# Patient Record
Sex: FEMALE | Race: BLACK OR AFRICAN AMERICAN | NOT HISPANIC OR LATINO | Employment: UNEMPLOYED | URBAN - METROPOLITAN AREA
[De-identification: names, ages, dates, MRNs, and addresses within clinical notes are randomized per-mention and may not be internally consistent; named-entity substitution may affect disease eponyms.]

---

## 2019-01-26 ENCOUNTER — HOSPITAL ENCOUNTER (OUTPATIENT)
Dept: LABOR AND DELIVERY | Facility: HOSPITAL | Age: 29
Discharge: HOME | End: 2019-01-26
Attending: OBSTETRICS & GYNECOLOGY

## 2019-04-24 ENCOUNTER — HOSPITAL ENCOUNTER (OUTPATIENT)
Dept: LABOR AND DELIVERY | Facility: HOSPITAL | Age: 29
Discharge: HOME | End: 2019-04-24
Attending: OBSTETRICS & GYNECOLOGY

## 2019-05-13 ENCOUNTER — HOSPITAL ENCOUNTER (OUTPATIENT)
Dept: LABOR AND DELIVERY | Facility: HOSPITAL | Age: 29
Discharge: HOME | End: 2019-05-13
Attending: OBSTETRICS & GYNECOLOGY

## 2021-02-08 ENCOUNTER — HOSPITAL ENCOUNTER (OUTPATIENT)
Dept: LABOR AND DELIVERY | Facility: HOSPITAL | Age: 31
Discharge: HOME | End: 2021-02-08
Attending: OBSTETRICS & GYNECOLOGY

## 2021-02-26 ENCOUNTER — HOSPITAL ENCOUNTER (OUTPATIENT)
Dept: PREADMISSION TESTING | Facility: HOSPITAL | Age: 31
Discharge: HOME | End: 2021-02-26
Attending: OBSTETRICS & GYNECOLOGY

## 2021-02-26 LAB — SARS-COV-2 RNA SPEC QL NAA+PROBE: NOT DETECTED

## 2021-10-21 ENCOUNTER — INITIAL PRENATAL (OUTPATIENT)
Dept: OBSTETRICS AND GYNECOLOGY | Facility: CLINIC | Age: 31
End: 2021-10-21

## 2021-10-21 VITALS
SYSTOLIC BLOOD PRESSURE: 156 MMHG | HEIGHT: 62 IN | WEIGHT: 129 LBS | DIASTOLIC BLOOD PRESSURE: 103 MMHG | BODY MASS INDEX: 23.74 KG/M2

## 2021-10-21 DIAGNOSIS — Z34.80 SUPERVISION OF OTHER NORMAL PREGNANCY, ANTEPARTUM: Primary | ICD-10-CM

## 2021-10-21 DIAGNOSIS — Z98.891 HISTORY OF 3 CESAREAN SECTIONS: ICD-10-CM

## 2021-10-21 DIAGNOSIS — O21.9 NAUSEA AND VOMITING DURING PREGNANCY: ICD-10-CM

## 2021-10-21 LAB
ABO GROUP BLD: NORMAL
B-HCG UR QL: POSITIVE
BASOPHILS # BLD AUTO: 0.02 10*3/MM3 (ref 0–0.2)
BASOPHILS NFR BLD AUTO: 0.3 % (ref 0–1.5)
BLD GP AB SCN SERPL QL: NEGATIVE
DEPRECATED RDW RBC AUTO: 44.3 FL (ref 37–54)
EOSINOPHIL # BLD AUTO: 0.04 10*3/MM3 (ref 0–0.4)
EOSINOPHIL NFR BLD AUTO: 0.6 % (ref 0.3–6.2)
ERYTHROCYTE [DISTWIDTH] IN BLOOD BY AUTOMATED COUNT: 13.3 % (ref 12.3–15.4)
EXPIRATION DATE: ABNORMAL
GLUCOSE UR STRIP-MCNC: NEGATIVE MG/DL
HCT VFR BLD AUTO: 39.6 % (ref 34–46.6)
HGB BLD-MCNC: 13.3 G/DL (ref 12–15.9)
HIV1+2 AB SER QL: NORMAL
IMM GRANULOCYTES # BLD AUTO: 0.01 10*3/MM3 (ref 0–0.05)
IMM GRANULOCYTES NFR BLD AUTO: 0.2 % (ref 0–0.5)
INTERNAL NEGATIVE CONTROL: ABNORMAL
INTERNAL POSITIVE CONTROL: ABNORMAL
LYMPHOCYTES # BLD AUTO: 1.78 10*3/MM3 (ref 0.7–3.1)
LYMPHOCYTES NFR BLD AUTO: 27.9 % (ref 19.6–45.3)
Lab: ABNORMAL
MCH RBC QN AUTO: 30.6 PG (ref 26.6–33)
MCHC RBC AUTO-ENTMCNC: 33.6 G/DL (ref 31.5–35.7)
MCV RBC AUTO: 91 FL (ref 79–97)
MONOCYTES # BLD AUTO: 0.46 10*3/MM3 (ref 0.1–0.9)
MONOCYTES NFR BLD AUTO: 7.2 % (ref 5–12)
NEUTROPHILS NFR BLD AUTO: 4.08 10*3/MM3 (ref 1.7–7)
NEUTROPHILS NFR BLD AUTO: 63.8 % (ref 42.7–76)
NRBC BLD AUTO-RTO: 0 /100 WBC (ref 0–0.2)
PLATELET # BLD AUTO: 208 10*3/MM3 (ref 140–450)
PMV BLD AUTO: 11.9 FL (ref 6–12)
PROT UR STRIP-MCNC: NEGATIVE MG/DL
RBC # BLD AUTO: 4.35 10*6/MM3 (ref 3.77–5.28)
RH BLD: POSITIVE
WBC # BLD AUTO: 6.39 10*3/MM3 (ref 3.4–10.8)

## 2021-10-21 PROCEDURE — G0432 EIA HIV-1/HIV-2 SCREEN: HCPCS | Performed by: OBSTETRICS & GYNECOLOGY

## 2021-10-21 PROCEDURE — 81025 URINE PREGNANCY TEST: CPT | Performed by: OBSTETRICS & GYNECOLOGY

## 2021-10-21 PROCEDURE — 87491 CHLMYD TRACH DNA AMP PROBE: CPT | Performed by: OBSTETRICS & GYNECOLOGY

## 2021-10-21 PROCEDURE — 86803 HEPATITIS C AB TEST: CPT | Performed by: OBSTETRICS & GYNECOLOGY

## 2021-10-21 PROCEDURE — G0123 SCREEN CERV/VAG THIN LAYER: HCPCS | Performed by: OBSTETRICS & GYNECOLOGY

## 2021-10-21 PROCEDURE — 87661 TRICHOMONAS VAGINALIS AMPLIF: CPT | Performed by: OBSTETRICS & GYNECOLOGY

## 2021-10-21 PROCEDURE — 87086 URINE CULTURE/COLONY COUNT: CPT | Performed by: OBSTETRICS & GYNECOLOGY

## 2021-10-21 PROCEDURE — 99214 OFFICE O/P EST MOD 30 MIN: CPT | Performed by: OBSTETRICS & GYNECOLOGY

## 2021-10-21 PROCEDURE — 87591 N.GONORRHOEAE DNA AMP PROB: CPT | Performed by: OBSTETRICS & GYNECOLOGY

## 2021-10-21 PROCEDURE — 83020 HEMOGLOBIN ELECTROPHORESIS: CPT | Performed by: OBSTETRICS & GYNECOLOGY

## 2021-10-21 RX ORDER — ONDANSETRON 4 MG/1
4 TABLET, ORALLY DISINTEGRATING ORAL EVERY 8 HOURS PRN
Qty: 40 TABLET | Refills: 3 | Status: SHIPPED | OUTPATIENT
Start: 2021-10-21 | End: 2022-05-10 | Stop reason: SDUPTHER

## 2021-10-21 NOTE — PROGRESS NOTES
"Chief Complaint:  Scheduled OB visit    HPI: 31 y.o.  at 9w1d   Nausea, desires Zofran ODT    Vitals:    10/21/21 1342 10/21/21 1343   BP: (!) 156/103    Weight: 58.5 kg (129 lb)    Height:  156.2 cm (61.5\")       See OB flowsheet also for pregnancy related data.  Physical Exam  Vitals and nursing note reviewed. Exam conducted with a chaperone present.   Constitutional:       Appearance: Normal appearance.   Neck:      Thyroid: No thyroid mass or thyromegaly.   Cardiovascular:      Rate and Rhythm: Regular rhythm.   Pulmonary:      Effort: Pulmonary effort is normal.      Unlabored  Abdominal:      General: There is no distension.      Palpations: Abdomen is soft.      Tenderness: There is no guarding or rebound.   Genitourinary:     General: Normal vulva.      Labia:   No lesions     Urethra: No urethral pain or urethral lesion.      Vagina: Normal. No vaginal discharge, tenderness or prolapsed vaginal walls.      Cervix: Normal.      Uterus: Normal.       Adnexa: Right adnexa normal and left adnexa normal.   Musculoskeletal:      Cervical back: Neck supple. No tenderness.   Skin:     General: Skin is warm and dry.   Neurological:      Mental Status: She is alert and oriented to person, place, and time.   Psychiatric:         Mood and Affect: Mood normal.         Behavior: Behavior normal.         Thought Content: Thought content normal.       A/P  1. Intrauterine pregnancy at 9w1d   2. Pregnancy Risk:  COMPLICATED   Complicated by prior  deliveries    Blood pressure is out of parameters.  No history of gestational hypertension      Diagnoses and all orders for this visit:    1. Supervision of other normal pregnancy, antepartum (Primary)  -     POC Urinalysis Dipstick  -     POC Pregnancy, Urine  -     OB Panel With HIV  -     Hemoglobinopathy Fractionation Cascade  -     Urine Culture - Urine, Urine, Clean Catch  -     IGP,CtNgTv,rfx Aptima HPV ASCU    2. History of 3  sections    3. Nausea " and vomiting during pregnancy  -     ondansetron ODT (Zofran ODT) 4 MG disintegrating tablet; Place 1 tablet on the tongue Every 8 (Eight) Hours As Needed for Nausea or Vomiting.  Dispense: 40 tablet; Refill: 3    Follow-up in 2 weeks to recheck blood pressure.  Will start baby aspirin and/or medication if blood pressure is elevated    Continue prenatal vitamins.  Discussed round ligament pain  Discussed constipation and use of stool softeners  Discussed need for office visit, fetal heart tones if early trimester bleeding  -----------------------  PLAN:   Return in about 4 weeks (around 11/18/2021).    Madi Del Toro Sr., MD  10/21/2021 14:09 EDT

## 2021-10-22 LAB
HBV SURFACE AG SERPL QL IA: NORMAL
HCV AB SER DONR QL: NORMAL
RPR SER QL: NORMAL

## 2021-10-23 LAB
BACTERIA SPEC AEROBE CULT: NORMAL
RUBV IGG SERPL IA-ACNC: 5.43 INDEX

## 2021-10-25 LAB
HGB A MFR BLD ELPH: 97.3 % (ref 96.4–98.8)
HGB A2 MFR BLD ELPH: 2.7 % (ref 1.8–3.2)
HGB F MFR BLD ELPH: 0 % (ref 0–2)
HGB FRACT BLD-IMP: NORMAL
HGB S MFR BLD ELPH: 0 %

## 2021-10-27 LAB
C TRACH RRNA CVX QL NAA+PROBE: NEGATIVE
CONV .: NORMAL
CYTOLOGIST CVX/VAG CYTO: NORMAL
CYTOLOGY CVX/VAG DOC CYTO: NORMAL
CYTOLOGY CVX/VAG DOC THIN PREP: NORMAL
DX ICD CODE: NORMAL
HIV 1 & 2 AB SER-IMP: NORMAL
Lab: NORMAL
N GONORRHOEA RRNA CVX QL NAA+PROBE: NEGATIVE
OTHER STN SPEC: NORMAL
STAT OF ADQ CVX/VAG CYTO-IMP: NORMAL
T VAGINALIS RRNA SPEC QL NAA+PROBE: NEGATIVE

## 2021-11-16 ENCOUNTER — ROUTINE PRENATAL (OUTPATIENT)
Dept: OBSTETRICS AND GYNECOLOGY | Facility: CLINIC | Age: 31
End: 2021-11-16

## 2021-11-16 VITALS — SYSTOLIC BLOOD PRESSURE: 147 MMHG | WEIGHT: 137 LBS | BODY MASS INDEX: 25.47 KG/M2 | DIASTOLIC BLOOD PRESSURE: 106 MMHG

## 2021-11-16 DIAGNOSIS — Z34.80 SUPERVISION OF OTHER NORMAL PREGNANCY, ANTEPARTUM: ICD-10-CM

## 2021-11-16 DIAGNOSIS — Z3A.12 12 WEEKS GESTATION OF PREGNANCY: Primary | ICD-10-CM

## 2021-11-16 DIAGNOSIS — I10 ESSENTIAL HYPERTENSION: ICD-10-CM

## 2021-11-16 LAB
ALBUMIN SERPL-MCNC: 4.2 G/DL (ref 3.5–5.2)
ALBUMIN/GLOB SERPL: 1.3 G/DL
ALP SERPL-CCNC: 66 U/L (ref 39–117)
ALT SERPL W P-5'-P-CCNC: 7 U/L (ref 1–33)
ANION GAP SERPL CALCULATED.3IONS-SCNC: 11.7 MMOL/L (ref 5–15)
AST SERPL-CCNC: 14 U/L (ref 1–32)
BILIRUB SERPL-MCNC: <0.2 MG/DL (ref 0–1.2)
BUN SERPL-MCNC: 12 MG/DL (ref 6–20)
BUN/CREAT SERPL: 41.4 (ref 7–25)
CALCIUM SPEC-SCNC: 9.6 MG/DL (ref 8.6–10.5)
CHLORIDE SERPL-SCNC: 101 MMOL/L (ref 98–107)
CO2 SERPL-SCNC: 21.3 MMOL/L (ref 22–29)
CREAT SERPL-MCNC: 0.29 MG/DL (ref 0.57–1)
CREAT UR-MCNC: 224 MG/DL
GFR SERPL CREATININE-BSD FRML MDRD: >150 ML/MIN/1.73
GLOBULIN UR ELPH-MCNC: 3.3 GM/DL
GLUCOSE SERPL-MCNC: 98 MG/DL (ref 65–99)
GLUCOSE UR STRIP-MCNC: NEGATIVE MG/DL
LEUKOCYTE EST, POC: NEGATIVE
NITRITE UR-MCNC: NEGATIVE MG/ML
POTASSIUM SERPL-SCNC: 4.2 MMOL/L (ref 3.5–5.2)
PROT SERPL-MCNC: 7.5 G/DL (ref 6–8.5)
PROT UR STRIP-MCNC: NEGATIVE MG/DL
PROT UR-MCNC: 27.2 MG/DL
PROT/CREAT UR: 0.1 MG/G{CREAT}
SODIUM SERPL-SCNC: 134 MMOL/L (ref 136–145)

## 2021-11-16 PROCEDURE — 84156 ASSAY OF PROTEIN URINE: CPT | Performed by: NURSE PRACTITIONER

## 2021-11-16 PROCEDURE — 99214 OFFICE O/P EST MOD 30 MIN: CPT | Performed by: NURSE PRACTITIONER

## 2021-11-16 PROCEDURE — 82570 ASSAY OF URINE CREATININE: CPT | Performed by: NURSE PRACTITIONER

## 2021-11-16 PROCEDURE — 80053 COMPREHEN METABOLIC PANEL: CPT | Performed by: NURSE PRACTITIONER

## 2021-11-16 RX ORDER — LABETALOL 100 MG/1
100 TABLET, FILM COATED ORAL 2 TIMES DAILY
Qty: 60 TABLET | Refills: 5 | Status: SHIPPED | OUTPATIENT
Start: 2021-11-16 | End: 2022-04-18 | Stop reason: SDUPTHER

## 2021-11-16 RX ORDER — ASPIRIN 81 MG/1
81 TABLET ORAL DAILY
Qty: 90 TABLET | Refills: 2 | Status: SHIPPED | OUTPATIENT
Start: 2021-11-16 | End: 2022-05-10

## 2021-11-29 ENCOUNTER — TELEPHONE (OUTPATIENT)
Dept: OBSTETRICS AND GYNECOLOGY | Facility: CLINIC | Age: 31
End: 2021-11-29

## 2021-12-21 ENCOUNTER — ROUTINE PRENATAL (OUTPATIENT)
Dept: OBSTETRICS AND GYNECOLOGY | Facility: CLINIC | Age: 31
End: 2021-12-21

## 2021-12-21 VITALS — DIASTOLIC BLOOD PRESSURE: 79 MMHG | SYSTOLIC BLOOD PRESSURE: 116 MMHG | BODY MASS INDEX: 27.55 KG/M2 | WEIGHT: 148.2 LBS

## 2021-12-21 DIAGNOSIS — I10 ESSENTIAL HYPERTENSION: ICD-10-CM

## 2021-12-21 DIAGNOSIS — Z3A.17 17 WEEKS GESTATION OF PREGNANCY: Primary | ICD-10-CM

## 2021-12-21 DIAGNOSIS — Z34.80 SUPERVISION OF OTHER NORMAL PREGNANCY, ANTEPARTUM: ICD-10-CM

## 2021-12-21 LAB
GLUCOSE UR STRIP-MCNC: NEGATIVE MG/DL
LEUKOCYTE EST, POC: NEGATIVE
NITRITE UR-MCNC: NEGATIVE MG/ML
PROT UR STRIP-MCNC: NEGATIVE MG/DL

## 2021-12-21 PROCEDURE — 99213 OFFICE O/P EST LOW 20 MIN: CPT | Performed by: NURSE PRACTITIONER

## 2021-12-21 PROCEDURE — 82105 ALPHA-FETOPROTEIN SERUM: CPT | Performed by: NURSE PRACTITIONER

## 2021-12-21 NOTE — PROGRESS NOTES
OB FOLLOW UP        Chief Complaint   Patient presents with   • Follow-up     17 weeks OB follow up        Subjective:   • No complaints  • Denies HA, vision changes/scotomata, RUQ/epigastric pain or UE/facial edema  • States is taking her blood pressure medication and doing well    Objective:  /79   Wt 67.2 kg (148 lb 3.2 oz)   LMP 08/18/2021   BMI 27.55 kg/m²   Uterine Size: size equals dates, below umbilicus  FHT: 110-160 BPM    See OB flow for LE edema, cvx exam if performed, and Upro/Uglu    Assessment and Plan:  17w6d  Reassuring pregnancy progress.  Questions answered.  Diagnoses and all orders for this visit:    1. 17 weeks gestation of pregnancy (Primary)  -     POC Urinalysis Dipstick    2. Supervision of other normal pregnancy, antepartum  Overview:  GUILLERMO finalized: 5/25/22    Genetic testing:    • NIPS    Vaccinations:  • COVID:   • Influenza:   • Tdap:    24-28 weeks:  • Tdap Rx:  • Rhogam:   • ?BTL/Bilat salpingectomy:    Third Trimester:  • GBS:  • Breast pump:  • ?HSV:    Ultrasound:  • Anatomy:  • FU US:    PROBLEM LIST/PLAN:   HTN - ASA 81mg and labetalol initiated 11/16/21         Orders:  -     Alpha Fetoprotein, Maternal  -     US Ob 14 + Weeks Single or First Gestation; Future    3. Essential hypertension    Counseling:    • Second trimester precautions  • Continue PNV.  Importance of healthy eating and exercise.    Return in about 2 weeks (around 1/4/2022) for [facility], Anatomy ultrasound.            Ajith Barrera, APRN  12/21/2021    [facility] [place]  [facility]  [address] [place] [address]  Dept: [phone]  Loc: [phone]

## 2021-12-28 LAB
AFP ADJ MOM SERPL: 0.91
AFP INTERP SERPL-IMP: NORMAL
AFP INTERP SERPL-IMP: NORMAL
AFP SERPL-MCNC: 43.7 NG/ML
AGE AT DELIVERY: 33.1 YR
GA METHOD: NORMAL
GA: 17.9 WEEKS
IDDM PATIENT QL: NORMAL
LABORATORY COMMENT REPORT: NORMAL
MULTIPLE PREGNANCY: NORMAL
NEURAL TUBE DEFECT RISK FETUS: NORMAL %
RESULT: NORMAL

## 2021-12-29 ENCOUNTER — TELEPHONE (OUTPATIENT)
Dept: OBSTETRICS AND GYNECOLOGY | Facility: CLINIC | Age: 31
End: 2021-12-29

## 2021-12-29 NOTE — TELEPHONE ENCOUNTER
----- Message from FREDERIC Livingston sent at 12/28/2021  3:04 PM EST -----  Please let patient know her screen for neural tube defects is negative.

## 2022-01-05 ENCOUNTER — ROUTINE PRENATAL (OUTPATIENT)
Dept: OBSTETRICS AND GYNECOLOGY | Facility: CLINIC | Age: 32
End: 2022-01-05

## 2022-01-05 VITALS — BODY MASS INDEX: 27.88 KG/M2 | WEIGHT: 150 LBS | SYSTOLIC BLOOD PRESSURE: 118 MMHG | DIASTOLIC BLOOD PRESSURE: 79 MMHG

## 2022-01-05 DIAGNOSIS — O10.919 CHRONIC HYPERTENSION AFFECTING PREGNANCY: ICD-10-CM

## 2022-01-05 DIAGNOSIS — Z34.80 SUPERVISION OF OTHER NORMAL PREGNANCY, ANTEPARTUM: Primary | ICD-10-CM

## 2022-01-05 LAB
GLUCOSE UR STRIP-MCNC: NEGATIVE MG/DL
PROT UR STRIP-MCNC: NEGATIVE MG/DL

## 2022-01-05 PROCEDURE — 99214 OFFICE O/P EST MOD 30 MIN: CPT | Performed by: OBSTETRICS & GYNECOLOGY

## 2022-01-05 NOTE — PROGRESS NOTES
Chief Complaint:  Scheduled OB visit    HPI: 31 y.o.  at 20w0d   No complaints, positive baby movement  Ultrasound done today    Vitals:    22 1402   BP: 118/79   Weight: 68 kg (150 lb)       See OB flowsheet also for pregnancy related data.    A/P  Intrauterine pregnancy at 20w0d   Diagnoses and all orders for this visit:    1. Supervision of other normal pregnancy, antepartum (Primary)  Overview:  GUILLERMO finalized: 22    Genetic testing:    • NIPS negative, gender male    Vaccinations:  • COVID: Unvaccinated  • Influenza: Vaccinated  • Tdap:    24-28 weeks:  • Tdap Rx:  • Rhogam: Rh+  • ?BTL/Bilat salpingectomy: Desires sterilization    Third Trimester:  • GBS:  • Breast pump:  • ?HSV: No exposure    Ultrasound:  • Anatomy: No placenta previa.  • FU US:    PROBLEM LIST/PLAN:   HTN - ASA 81mg and labetalol initiated 21         Orders:  -     POC Urinalysis Dipstick    2. Chronic hypertension affecting pregnancy  -     US Ob 14 + Weeks Single or First Gestation; Future  Continue labetalol 100 mg twice daily.  Blood pressure well controlled    Continue prenatal vitamins.  Pre-eclampsia symptoms were discussed and warnings were given.    PLAN:   Return in about 4 weeks (around 2022).    Madi Del Toro Sr., MD  2022 14:40 EST

## 2022-01-12 ENCOUNTER — TELEPHONE (OUTPATIENT)
Dept: LABOR AND DELIVERY | Facility: HOSPITAL | Age: 32
End: 2022-01-12

## 2022-01-12 NOTE — TELEPHONE ENCOUNTER
Called patient to introduce [employer] program. No answer. Left message and return number.

## 2022-02-02 ENCOUNTER — TELEPHONE (OUTPATIENT)
Dept: OBSTETRICS AND GYNECOLOGY | Facility: CLINIC | Age: 32
End: 2022-02-02

## 2022-02-02 NOTE — TELEPHONE ENCOUNTER
CALLED PT AND LEFT A VM FOR HER TO CALL BACK AND RESCHEDULE HER APPOINTMENTS DUE TO THE WEATHER. I ALSO LEFT HER A MSG ON MY CHART.  IF SHE CALLS IN PLEASE RESCHEDULE HER APPT FOR US AND OB APPT.         By Priyanka Guardado RegSched Rep

## 2022-02-10 ENCOUNTER — ROUTINE PRENATAL (OUTPATIENT)
Dept: OBSTETRICS AND GYNECOLOGY | Facility: CLINIC | Age: 32
End: 2022-02-10

## 2022-02-10 VITALS — WEIGHT: 157 LBS | BODY MASS INDEX: 29.18 KG/M2 | DIASTOLIC BLOOD PRESSURE: 78 MMHG | SYSTOLIC BLOOD PRESSURE: 117 MMHG

## 2022-02-10 DIAGNOSIS — O10.919 CHRONIC HYPERTENSION AFFECTING PREGNANCY: ICD-10-CM

## 2022-02-10 DIAGNOSIS — Z34.80 SUPERVISION OF OTHER NORMAL PREGNANCY, ANTEPARTUM: Primary | ICD-10-CM

## 2022-02-10 LAB
DEPRECATED RDW RBC AUTO: 38.1 FL (ref 37–54)
ERYTHROCYTE [DISTWIDTH] IN BLOOD BY AUTOMATED COUNT: 11.6 % (ref 12.3–15.4)
GLUCOSE 1H P GLC SERPL-MCNC: 153 MG/DL (ref 65–139)
GLUCOSE UR STRIP-MCNC: NEGATIVE MG/DL
HCT VFR BLD AUTO: 32 % (ref 34–46.6)
HGB BLD-MCNC: 10.8 G/DL (ref 12–15.9)
MCH RBC QN AUTO: 30.9 PG (ref 26.6–33)
MCHC RBC AUTO-ENTMCNC: 33.8 G/DL (ref 31.5–35.7)
MCV RBC AUTO: 91.7 FL (ref 79–97)
PLATELET # BLD AUTO: 157 10*3/MM3 (ref 140–450)
PMV BLD AUTO: 13.2 FL (ref 6–12)
PROT UR STRIP-MCNC: NEGATIVE MG/DL
RBC # BLD AUTO: 3.49 10*6/MM3 (ref 3.77–5.28)
WBC NRBC COR # BLD: 7.04 10*3/MM3 (ref 3.4–10.8)

## 2022-02-10 PROCEDURE — 99214 OFFICE O/P EST MOD 30 MIN: CPT | Performed by: OBSTETRICS & GYNECOLOGY

## 2022-02-10 PROCEDURE — 85027 COMPLETE CBC AUTOMATED: CPT | Performed by: OBSTETRICS & GYNECOLOGY

## 2022-02-10 PROCEDURE — 82950 GLUCOSE TEST: CPT | Performed by: OBSTETRICS & GYNECOLOGY

## 2022-02-10 RX ORDER — PRENATAL VIT NO.126/IRON/FOLIC 28MG-0.8MG
1 TABLET ORAL DAILY
COMMUNITY

## 2022-02-10 NOTE — PROGRESS NOTES
Chief Complaint:  Scheduled OB visit    HPI: 31 y.o.  at 25w1d   Positive baby movement  Glucola performed today  Confirmed labetalol 100 mg twice daily    Vitals:    02/10/22 1052   BP: 117/78   Weight: 71.2 kg (157 lb)       See OB flowsheet also for pregnancy related data.    A/P  Intrauterine pregnancy at 25w1d   Diagnoses and all orders for this visit:    1. Supervision of other normal pregnancy, antepartum (Primary)  Overview:  GUILLERMO finalized: 22    Genetic testing:    • NIPS negative, gender male    Vaccinations:  • COVID: Unvaccinated  • Influenza: Vaccinated  • Tdap:    24-28 weeks:  • Tdap Rx:  • Rhogam: Rh+  • ?BTL/Bilat salpingectomy: Desires sterilization    Third Trimester:  • GBS:  • Breast pump:  • ?HSV: No exposure    Ultrasound:  • Anatomy: No placenta previa.  • FU US:    PROBLEM LIST/PLAN:   HTN - ASA 81mg and labetalol initiated 21         Orders:  -     POC Urinalysis Dipstick  -     CBC (No Diff)  -     Gestational Diabetes Screen    2. Chronic hypertension affecting pregnancy  Ultrasound is scheduled in approximately 2 weeks for growth with chronic hypertension  Continue labetalol 100 mg twice daily, blood pressure great today    Continue prenatal vitamins.      PLAN:   Return in about 2 weeks (around 2022).    Madi Del Toro Sr., MD  2/10/2022 11:21 EST

## 2022-02-11 ENCOUNTER — TELEPHONE (OUTPATIENT)
Dept: OBSTETRICS AND GYNECOLOGY | Facility: CLINIC | Age: 32
End: 2022-02-11

## 2022-02-11 DIAGNOSIS — O99.019 MATERNAL ANEMIA IN PREGNANCY, ANTEPARTUM: Primary | ICD-10-CM

## 2022-02-11 RX ORDER — IRON,CARBONYL/ASCORBIC ACID 100-250 MG
1 TABLET ORAL DAILY
Qty: 90 EACH | Refills: 2 | Status: SHIPPED | OUTPATIENT
Start: 2022-02-11 | End: 2022-05-10

## 2022-02-11 NOTE — TELEPHONE ENCOUNTER
----- Message from Madi Del Toro Sr., MD sent at 2/11/2022  3:07 PM EST -----  Need 3-hour GTT, need iron   ----- Message -----  From: Marilu Encarnacion MA  Sent: 2/10/2022  10:55 AM EST  To: Madi Del Toro Sr., MD

## 2022-02-17 ENCOUNTER — TELEPHONE (OUTPATIENT)
Dept: OBSTETRICS AND GYNECOLOGY | Facility: CLINIC | Age: 32
End: 2022-02-17

## 2022-02-17 NOTE — TELEPHONE ENCOUNTER
Discussed the needs for three hr gtt ASAP. Adv we have also sent iron to her pharmacy for iron deficiency. PT has an ultrasound @ [facility] on Monday, adv if she could do her three hr that morning that would be perfect but just stressed the importance of getting it done asap.

## 2022-02-17 NOTE — TELEPHONE ENCOUNTER
----- Message from Madi Del Toro Sr., MD sent at 2/11/2022  3:09 PM EST -----  Iron with vitamin C prescription sent to [facility] pharmacy  ----- Message -----  From: Marilu Encarnacion MA  Sent: 2/10/2022  10:55 AM EST  To: Madi Del Toro Sr., MD

## 2022-02-21 ENCOUNTER — TELEPHONE (OUTPATIENT)
Dept: OBSTETRICS AND GYNECOLOGY | Facility: CLINIC | Age: 32
End: 2022-02-21

## 2022-02-21 ENCOUNTER — ROUTINE PRENATAL (OUTPATIENT)
Dept: OBSTETRICS AND GYNECOLOGY | Facility: CLINIC | Age: 32
End: 2022-02-21

## 2022-02-21 ENCOUNTER — HOSPITAL ENCOUNTER (OUTPATIENT)
Dept: ULTRASOUND IMAGING | Facility: HOSPITAL | Age: 32
Discharge: HOME | End: 2022-02-21
Admitting: OBSTETRICS & GYNECOLOGY

## 2022-02-21 VITALS — SYSTOLIC BLOOD PRESSURE: 123 MMHG | DIASTOLIC BLOOD PRESSURE: 80 MMHG | BODY MASS INDEX: 29.48 KG/M2 | WEIGHT: 158.6 LBS

## 2022-02-21 DIAGNOSIS — N88.8 CERVICAL FUNNELING: ICD-10-CM

## 2022-02-21 DIAGNOSIS — Z34.80 SUPERVISION OF OTHER NORMAL PREGNANCY, ANTEPARTUM: Primary | ICD-10-CM

## 2022-02-21 DIAGNOSIS — O10.919 CHRONIC HYPERTENSION AFFECTING PREGNANCY: ICD-10-CM

## 2022-02-21 LAB
GLUCOSE UR STRIP-MCNC: NEGATIVE MG/DL
PROT UR STRIP-MCNC: NEGATIVE MG/DL

## 2022-02-21 PROCEDURE — 76805 OB US >/= 14 WKS SNGL FETUS: CPT

## 2022-02-21 PROCEDURE — 99213 OFFICE O/P EST LOW 20 MIN: CPT | Performed by: OBSTETRICS & GYNECOLOGY

## 2022-02-21 NOTE — TELEPHONE ENCOUNTER
Received a call from Dr. Martin at 1441 hr stating patient had growth scan today.  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 Discussed with Dr. Guzman ([facility]) who advised patient to come in for cervical check.  Patient was contacted at 1458 and states can be here within the next 45 minutes.

## 2022-02-21 NOTE — PROGRESS NOTES
OB FOLLOW UP      Chief Complaint   Patient presents with   • Routine Prenatal Visit     F/U US     Subjective:   • Pt seen for US at [facility].  Radiology called to state cvx appeared to have internal funneling intermittently in scan.  Scan was abd only.  Pt denies cxns, VB, leaking or abn vag DC.  All CS x3 at term.  Pt contacted by office to come today for eval.    Objective:  /80   Wt 71.9 kg (158 lb 9.6 oz)   LMP 08/18/2021   BMI 29.48 kg/m²  13.4 kg (29 lb 9.6 oz)  Uterine Size: not examined.  Abdomen soft NT.  FHT: see US  Pelvic exam: Cvx visably closed, on exam L/T/C, midposition, medium consistency.  No vag dc.  No odor.  See OB flow for edema, cvx exam if performed, and Upro/Uglu    Assessment and Plan:  26w5d  Reassuring pregnancy progress.  Questions answered.  Diagnoses and all orders for this visit:    1. Cervical funneling- possible by [facility] anatomy scan  -     US Ob Transvaginal tomorrow.  Exam today reassurring.  DW pt briefly possible outcomes depending on TVUS tomorrow.  No US tech in office today to perform cvx length.  Rec pt avoid sex or heavy lifting.  Questions answered.    Orders:  -     POC Urinalysis Dipstick    Counseling:    • OB precautions, leaking, VB, valentín ornelas vs PTL/Labor  • FKC  • Continue PNV.  Importance of healthy eating and staying active.    Return in about 1 day (around 2/22/2022) for Follow up US.            Lia Guzman,   02/21/2022    [facility]  [facility] [place] [address]  Dept: [phone]  Dept Fax: [phone]  Loc: [phone]  Loc Fax: [phone]

## 2022-02-22 ENCOUNTER — ROUTINE PRENATAL (OUTPATIENT)
Dept: OBSTETRICS AND GYNECOLOGY | Facility: CLINIC | Age: 32
End: 2022-02-22

## 2022-02-22 VITALS — WEIGHT: 158 LBS | DIASTOLIC BLOOD PRESSURE: 81 MMHG | SYSTOLIC BLOOD PRESSURE: 118 MMHG | BODY MASS INDEX: 29.37 KG/M2

## 2022-02-22 DIAGNOSIS — Z34.80 SUPERVISION OF OTHER NORMAL PREGNANCY, ANTEPARTUM: Primary | ICD-10-CM

## 2022-02-22 PROBLEM — N88.8 CERVICAL FUNNELING: Status: RESOLVED | Noted: 2022-02-21 | Resolved: 2022-02-22

## 2022-02-22 LAB
GLUCOSE UR STRIP-MCNC: NEGATIVE MG/DL
PROT UR STRIP-MCNC: NEGATIVE MG/DL

## 2022-02-22 PROCEDURE — 99214 OFFICE O/P EST MOD 30 MIN: CPT | Performed by: OBSTETRICS & GYNECOLOGY

## 2022-02-22 NOTE — PROGRESS NOTES
OB FOLLOW UP      Chief Complaint   Patient presents with   • Routine Prenatal Visit     Subjective:   • No complaints  • Denies HA, vision changes/scotomata, RUQ/epigastric pain or UE/facial edema    Objective:  /81   Wt 71.7 kg (158 lb)   LMP 08/18/2021   BMI 29.37 kg/m²  13.2 kg (29 lb)  Uterine Size: not examined, US today- NORMAL CVX LENGTH 4.5CM  FHT: 110-160 BPM    See OB flow for edema, cvx exam if performed, and Upro/Uglu    Assessment and Plan:  26w6d  Reassuring pregnancy progress.  Questions answered.  Diagnoses and all orders for this visit:    1. Supervision of other normal pregnancy, antepartum (Primary)  Overview:  GUILLERMO finalized: 5/25/22 by LMP and anatomy US    Genetic testing:    • NIPS negative, gender male    Vaccinations:  COVID: 2/22/2022 recommended  Influenza: Vaccinated  Tdap: 2/22/2022 plans on it    ?BTL/Bilat salpingectomy: 2/22/2022 declines, considering alternatives    Anatomy: No placenta previa, posterior  FU US: 2/21/22 CWD cvx funneling   2/22/22 normal cvx length    PROBLEM LIST/PLAN:   CHTN - ASA 81mg, recommend continue and labetalol initiated 11/16/21, 100mg BID, continue   Anemia- Hct 32, oral iron, continue  Elev 1hr- 2/22/2022 3hr GTT  ? Funneling on US [facility] 2/21- US [facility] cvx length 4.5cm, no funneling.    Orders:  -     POC Urinalysis Dipstick    Counseling:    • OB precautions, leaking, VB, valentín ornelas vs PTL/Labor  • FKC  • Discussed importance of VACCINES in pregnancy.  Maternal and fetal risk of not being vaccinated reviewed NLT increased risk maternal/fetal severity of illness/death, PTD, CS, hemorrhage, HTN, IUFD.  Significant maternal and fetal/infant benefit w vaccination.  FDA approval and ACOG/SMFM/CDC strong recommendation in pregnancy.  Questions answered.  Importance of preventative measures, eg hand washing, wearing a mask, and physical distancing.  • Continue PNV.  Importance of healthy eating and staying active.    Return in about 2 weeks (around  3/8/2022) for Follow up OB w Dr. Del Toro.  Can cancel tomorrow OV. .            Lia Guzman, DO  02/22/2022    [facility]  [address] [place] [address]  Dept: [phone]  Dept Fax: [phone]  Loc: [phone]  Loc Fax: [phone]

## 2022-03-10 ENCOUNTER — ROUTINE PRENATAL (OUTPATIENT)
Dept: OBSTETRICS AND GYNECOLOGY | Facility: CLINIC | Age: 32
End: 2022-03-10

## 2022-03-10 VITALS — DIASTOLIC BLOOD PRESSURE: 81 MMHG | SYSTOLIC BLOOD PRESSURE: 122 MMHG | BODY MASS INDEX: 29.48 KG/M2 | WEIGHT: 158.6 LBS

## 2022-03-10 DIAGNOSIS — N88.8 CERVICAL FUNNELING: Primary | ICD-10-CM

## 2022-03-10 DIAGNOSIS — Z34.80 SUPERVISION OF OTHER NORMAL PREGNANCY, ANTEPARTUM: ICD-10-CM

## 2022-03-10 DIAGNOSIS — O10.919 CHRONIC HYPERTENSION AFFECTING PREGNANCY: ICD-10-CM

## 2022-03-10 LAB
GLUCOSE UR STRIP-MCNC: NEGATIVE MG/DL
PROT UR STRIP-MCNC: ABNORMAL MG/DL

## 2022-03-10 PROCEDURE — 99213 OFFICE O/P EST LOW 20 MIN: CPT | Performed by: OBSTETRICS & GYNECOLOGY

## 2022-03-10 NOTE — PROGRESS NOTES
Chief Complaint:  Scheduled OB visit    HPI: 31 y.o.  at 29w1d   Positive baby movement    Vitals:    03/10/22 1059   BP: 122/81   Weight: 71.9 kg (158 lb 9.6 oz)       See OB flowsheet also for pregnancy related data.    A/P  Intrauterine pregnancy at 29w1d   Diagnoses and all orders for this visit:    1. Cervical funneling (Primary)    2. Supervision of other normal pregnancy, antepartum  Overview:  GUILLERMO finalized: 22 by LMP and anatomy US    Genetic testing:    • NIPS negative, gender male    Vaccinations:  COVID: 2022 recommended  Influenza: Vaccinated  Tdap: 2022 plans on it    ?BTL/Bilat salpingectomy: 2022 declines, considering alternatives    Anatomy: No placenta previa, posterior  FU US: 22 CWD cvx funneling   22 normal cvx length    PROBLEM LIST/PLAN:   CHTN - ASA 81mg, recommend continue and labetalol initiated 21, 100mg BID, continue   Anemia- Hct 32, oral iron, continue  Elev 1hr- 2022 3hr GTT  ? Funneling on US [facility] - US [facility] cvx length 4.5cm, no funneling.    Orders:  -     POC Urinalysis Dipstick    3. Chronic hypertension affecting pregnancy  -     US Ob 14 + Weeks Single or First Gestation; Future    Normotensive today  Discussed possible delivery in the 38-39th week depending on blood pressures.  Need growth ultrasound in 2 weeks  Continue prenatal vitamins.  Encouraged fetal kick counts, 10 movements in 2 hours every day.  To labor and delivery if lack fetal movement  Routine labor warnings were discussed and indications for L & D f/u including bleeding, regular contractions, decreased fetal movement or/and rupture of membranes.     PLAN:   Return in about 2 weeks (around 3/24/2022).    Madi Del Toro Sr., MD  3/10/2022 11:23 EST

## 2022-03-21 PROBLEM — I10 ESSENTIAL HYPERTENSION: Status: RESOLVED | Noted: 2021-11-16 | Resolved: 2022-03-21

## 2022-03-21 NOTE — PROGRESS NOTES
OB FOLLOW UP      Chief Complaint   Patient presents with   • Routine Prenatal Visit     Subjective:   • No complaints    Objective:  /84   Wt 72.1 kg (159 lb)   LMP 08/18/2021   BMI 29.56 kg/m²  13.6 kg (30 lb)  Uterine Size: not examined, US today  FHT: 110-160 BPM    See OB flow for edema, cvx exam if performed, and Upro/Uglu    Assessment and Plan:  30w5d  Reassuring pregnancy progress.  Questions answered.  Diagnoses and all orders for this visit:    1. Supervision of other normal pregnancy, antepartum (Primary)  Overview:  GUILLERMO finalized: 5/25/22 by LMP and anatomy US    NIPS negative, gender male    COVID: 3/22/2022 recommended, pt considering.  ACOG website given.  Influenza: Vaccinated  Tdap: 3/22/2022 Rx regiven, pt plans    ?BTL/Bilat salpingectomy: No    Anatomy: No placenta previa, posterior  FU US: 2/21/22 CWD cvx funneling   2/22/22 normal cvx length  FU US: 3/22/2022 wnl, 3#12oz, 58%ile    PROBLEM LIST/PLAN:   CHTN - ASA 81mg, Labetalol (initiated 11/16/21) 100mg BID, continue.  Serial US growth, APT.  Anemia- Hct 32, oral iron, continue  Elev 1hr- 2/22/2022 3hr GTT- never done.  3/22/2022 declines 3hr, glucometer given  Hx CX x3- schedule repeat    Orders:  -     POC Urinalysis Dipstick  -     US Ob 14 + Weeks Single or First Gestation; Future    2. Maternal anemia in pregnancy, antepartum  -     ferrous sulfate 325 (65 FE) MG tablet; Take 1 tablet by mouth Every Other Day.  Dispense: 30 tablet; Refill: 2    3. Elevated glucose tolerance test  -     Alcohol Swabs pads; Use to clean fingers before checking BS 4 times per day and PRN  Dispense: 120 each; Refill: 3  -     glucose monitor monitoring kit; Check BS FOUR times per day (fasting and 2hrs after meals) and record.  Bring blood sugar record to next office visit.  Dispense: 1 each; Refill: 0    Counseling:    • OB precautions, leaking, VB, valentín ornelas vs PTL/Labor  • FKC  • Continue PNV.  Importance of healthy eating and staying  active.    Return in about 2 weeks (around 4/5/2022) for Follow up OB [id number] to 36weeks.            Lia Guzman,   03/22/2022    [facility]  [address] [place] [address]  Dept: [phone]  Dept Fax: [phone]  Loc: [phone]  Loc Fax: [phone]

## 2022-03-22 ENCOUNTER — ROUTINE PRENATAL (OUTPATIENT)
Dept: OBSTETRICS AND GYNECOLOGY | Facility: CLINIC | Age: 32
End: 2022-03-22

## 2022-03-22 VITALS — BODY MASS INDEX: 29.56 KG/M2 | DIASTOLIC BLOOD PRESSURE: 84 MMHG | WEIGHT: 159 LBS | SYSTOLIC BLOOD PRESSURE: 128 MMHG

## 2022-03-22 DIAGNOSIS — O99.019 MATERNAL ANEMIA IN PREGNANCY, ANTEPARTUM: ICD-10-CM

## 2022-03-22 DIAGNOSIS — Z34.80 SUPERVISION OF OTHER NORMAL PREGNANCY, ANTEPARTUM: Primary | ICD-10-CM

## 2022-03-22 DIAGNOSIS — R73.09 ELEVATED GLUCOSE TOLERANCE TEST: ICD-10-CM

## 2022-03-22 LAB
GLUCOSE UR STRIP-MCNC: NEGATIVE MG/DL
PROT UR STRIP-MCNC: NEGATIVE MG/DL

## 2022-03-22 PROCEDURE — 99214 OFFICE O/P EST MOD 30 MIN: CPT | Performed by: OBSTETRICS & GYNECOLOGY

## 2022-03-22 RX ORDER — FERROUS SULFATE 325(65) MG
325 TABLET ORAL EVERY OTHER DAY
Qty: 30 TABLET | Refills: 2 | Status: SHIPPED | OUTPATIENT
Start: 2022-03-22

## 2022-03-22 RX ORDER — BLOOD PRESSURE TEST KIT
KIT MISCELLANEOUS
Qty: 120 EACH | Refills: 3 | Status: ON HOLD | OUTPATIENT
Start: 2022-03-22 | End: 2022-05-11

## 2022-03-22 RX ORDER — BLOOD-GLUCOSE METER
KIT MISCELLANEOUS
Qty: 1 EACH | Refills: 0 | Status: ON HOLD | OUTPATIENT
Start: 2022-03-22 | End: 2022-05-11

## 2022-04-07 NOTE — PROGRESS NOTES
"OB FOLLOW UP      Chief Complaint   Patient presents with   • Routine Prenatal Visit     Subjective:   • No complaints    Objective:  /82   Wt 71.7 kg (158 lb)   LMP 08/18/2021   BMI 29.37 kg/m²  13.2 kg (29 lb)  Uterine Size: size equals dates  FHT: 110-160 BPM    See OB flow for edema, cvx exam if performed, and Upro/Uglu    Assessment and Plan:  33w1d  Reassuring pregnancy progress.  Questions answered.  Diagnoses and all orders for this visit:    1. Supervision of other normal pregnancy, antepartum (Primary)  Overview:  GUILLERMO finalized: 5/25/22 by LMP and anatomy US    NIPS negative, gender male    COVID: 3/22/2022 recommended, pt considering.  ACOG website given.  Influenza: vaccinated  Tdap: vaccinated    ?BTL/Bilat salpingectomy: No    Anatomy: No placenta previa, posterior  FU US: 2/21/22 CWD cvx funneling   2/22/22 normal cvx length  FU US: 3/22/2022 wnl, 3#12oz, 58%ile    PROBLEM LIST/PLAN:   CHTN - ASA 81mg, Labetalol (initiated 11/16/21) 100mg BID, continue.  Serial US growth.  4/8/2022 NST biweekly ordered.  Anemia- Hct 32, oral iron, continue  Elev 1hr- 3/22/2022 declines 3hr, glucometer given.  4/8/2022 no log today.  F \"80's\".  2hr PP \"\".    Ok to stop checking, bring log next OV to review.   Hx CX x3- schedule repeat Del 37-39+6: 11May 38+0 AP RCS [facility] in OR    Orders:  -     POC Urinalysis Dipstick  -     Fetal Nonstress Test; Future      Counseling:    • OB precautions, leaking, VB, valentín ornelas vs PTL/Labor  • FKC  • Continue PNV.  Importance of healthy eating and staying active.    Return in about 2 weeks (around 4/22/2022) for Follow up OB then q 1week to GUILLREMO.  Schedule NSTs..            Lia Guzman,   04/08/2022    [facility]  [address] [place] [address]  Dept: [phone]  Dept Fax: [phone]  Loc: [phone]  Loc Fax: [phone]   "

## 2022-04-08 ENCOUNTER — ROUTINE PRENATAL (OUTPATIENT)
Dept: OBSTETRICS AND GYNECOLOGY | Facility: CLINIC | Age: 32
End: 2022-04-08

## 2022-04-08 VITALS — SYSTOLIC BLOOD PRESSURE: 120 MMHG | BODY MASS INDEX: 29.37 KG/M2 | WEIGHT: 158 LBS | DIASTOLIC BLOOD PRESSURE: 82 MMHG

## 2022-04-08 DIAGNOSIS — R73.09 ELEVATED GLUCOSE TOLERANCE TEST: ICD-10-CM

## 2022-04-08 DIAGNOSIS — Z34.80 SUPERVISION OF OTHER NORMAL PREGNANCY, ANTEPARTUM: Primary | ICD-10-CM

## 2022-04-08 DIAGNOSIS — O99.019 MATERNAL ANEMIA IN PREGNANCY, ANTEPARTUM: ICD-10-CM

## 2022-04-08 LAB
GLUCOSE UR STRIP-MCNC: NEGATIVE MG/DL
PROT UR STRIP-MCNC: ABNORMAL MG/DL

## 2022-04-08 PROCEDURE — 99214 OFFICE O/P EST MOD 30 MIN: CPT | Performed by: OBSTETRICS & GYNECOLOGY

## 2022-04-11 ENCOUNTER — HOSPITAL ENCOUNTER (OUTPATIENT)
Facility: HOSPITAL | Age: 32
Discharge: HOME | End: 2022-04-11
Attending: OBSTETRICS & GYNECOLOGY | Admitting: STUDENT IN AN ORGANIZED HEALTH CARE EDUCATION/TRAINING PROGRAM

## 2022-04-11 VITALS
DIASTOLIC BLOOD PRESSURE: 85 MMHG | OXYGEN SATURATION: 100 % | TEMPERATURE: 98.3 F | HEART RATE: 99 BPM | SYSTOLIC BLOOD PRESSURE: 123 MMHG | RESPIRATION RATE: 16 BRPM

## 2022-04-11 PROCEDURE — 59025 FETAL NON-STRESS TEST: CPT

## 2022-04-11 PROCEDURE — G0463 HOSPITAL OUTPT CLINIC VISIT: HCPCS

## 2022-04-11 PROCEDURE — 59025 FETAL NON-STRESS TEST: CPT | Performed by: STUDENT IN AN ORGANIZED HEALTH CARE EDUCATION/TRAINING PROGRAM

## 2022-04-11 NOTE — NON STRESS TEST
Patient Vitals for the past 24 hrs:   BP Temp Temp src Pulse Resp   22 1334 123/85 -- -- 99 --   22 1325 122/79 98.3 °F (36.8 °C) Oral 117 16     Obstetrical Non-stress Test Interpretation     Name:  Sia Ramos  MRN: [id number]    32 y.o. female  at 33w5d    Indication: Chronic hypertension on Labetalol 100 mg PO BID, Previous C/S      Fetal Assessment  Fetal Movement: active  Fetal HR Assessment Method: external  Fetal HR (beats/min): 140  Fetal HR Baseline: normal range  Fetal HR Variability: moderate (amplitude range 6 to 25 bpm)  Fetal HR Accelerations: episodic  Fetal HR Decelerations: absent  Sinusoidal Pattern Present: absent    LMP 2021     Reason for test: Hypertension (Chronic hypertension on Labetalol 100 mg PO BID)  Date of Test: 2022  Time frame of test:12:55- 13:33  RN NST Interpretation: Nury Skelton RN  2022  13:46 EDT

## 2022-04-14 ENCOUNTER — HOSPITAL ENCOUNTER (OUTPATIENT)
Facility: HOSPITAL | Age: 32
Discharge: HOME | End: 2022-04-14
Attending: OBSTETRICS & GYNECOLOGY | Admitting: OBSTETRICS & GYNECOLOGY

## 2022-04-14 ENCOUNTER — HOSPITAL ENCOUNTER (OUTPATIENT)
Dept: LABOR AND DELIVERY | Facility: HOSPITAL | Age: 32
Setting detail: SURGERY ADMIT
Discharge: HOME | End: 2022-04-14

## 2022-04-14 VITALS
OXYGEN SATURATION: 100 % | DIASTOLIC BLOOD PRESSURE: 86 MMHG | RESPIRATION RATE: 20 BRPM | HEART RATE: 91 BPM | TEMPERATURE: 98.3 F | SYSTOLIC BLOOD PRESSURE: 130 MMHG

## 2022-04-14 PROCEDURE — 59025 FETAL NON-STRESS TEST: CPT | Performed by: OBSTETRICS & GYNECOLOGY

## 2022-04-14 PROCEDURE — 59025 FETAL NON-STRESS TEST: CPT

## 2022-04-14 NOTE — NON STRESS TEST
Patient Vitals for the past 24 hrs:   BP Temp Temp src Pulse Resp SpO2   22 1455 -- -- -- 105 -- 100 %   22 1445 128/90 98.3 °F (36.8 °C) Oral 104 20 100 %   22 1440 119/88 -- -- -- -- --     Obstetrical Non-stress Test Interpretation     Name:  Sia Ramos  MRN: [id number]    32 y.o. female  at 34w1d    Indication: CHTN on Labetalol 100mg. PO BID      Fetal Assessment  Fetal Movement: active  Fetal HR Assessment Method: external  Fetal HR (beats/min): 145  Fetal HR Baseline: normal range  Fetal HR Variability: moderate (amplitude range 6 to 25 bpm)  Fetal HR Accelerations: episodic  Fetal HR Decelerations: absent  Sinusoidal Pattern Present: absent    /90 (BP Location: Left arm, Patient Position: Lying)   Pulse 105   Temp 98.3 °F (36.8 °C) (Oral)   Resp 20   LMP 2021   SpO2 100%     Reason for test: Hypertension, Other (Comment) (CHTN on Labetalol 100 mg PO BID)  Date of Test: 2022  Time frame of test: 14:37-15:11  RN NST Interpretation: Nury Skelton RN  2022  15:30 EDT

## 2022-04-15 NOTE — NON STRESS TEST
AZAR Bhakta   OB NST Note    2022   Name:  Sia Ramos  MRN: [id number]    Subjective:  32 y.o.  at 34w1d    Indication: CHTN    NST:   Baseline: 140  Variability:   Moderate/Normal (amplitude 6-25 bpm)  Accelerations: Present (32 weeks+) 15 x 15 bpm  Decelerations: Absent   Contractions:  Not regular    NST interpretation: reactive    Documented Vitals    22 1445 22 1455 22 1500 22 1505   BP: 128/90  130/86    Pulse: 104 105 94 91   Resp: 20  20    Temp: 98.3 °F (36.8 °C)      SpO2: 100% 100% 100% 100%         Lab Results (last 24 hours)     ** No results found for the last 24 hours. **           Assessment:  32 y.o.  AT 34w1d  CHTN    Plan:   OB Precautions, HTN Precautions, FKC, Keep scheduled NSTs, Keep office visit, Keep scheduled CS      Electronically signed by Roshan Bishop MD, 22, 11:18 PM EDT.

## 2022-04-17 NOTE — PROGRESS NOTES
OB FOLLOW UP      Chief Complaint   Patient presents with   • Routine Prenatal Visit     Subjective:   • No complaints  • Denies HA, vision changes/scotomata, RUQ/epigastric pain or UE/facial edema    Objective:  /99   Wt 74.4 kg (164 lb)   LMP 08/18/2021   BMI 30.49 kg/m²  15.9 kg (35 lb)  Uterine Size: not examined, US today  FHT: 110-160 BPM    See OB flow for edema, cvx exam if performed, and Upro/Uglu    Assessment and Plan:  34w4d  Reassuring pregnancy progress.  Questions answered.  Diagnoses and all orders for this visit:    1. Supervision of other normal pregnancy, antepartum (Primary)  Overview:  GUILLERMO finalized: 5/25/22 by LMP and anatomy US    NIPS negative, gender male    COVID: 4/18/2022 recommended, declines, considering PP  Influenza: vaccinated  Tdap: vaccinated    ?BTL/Bilat salpingectomy: No    Anatomy: No placenta previa, posterior  FU US: 2/21/22 CWD   FU US: 3/22/2022 wnl, 3#12oz, 58%ile  FU US: 4/18/2022 VTX 5#5oz, 53%ile    PROBLEM LIST/PLAN:   CHTN - ASA 81mg, Labetalol (initiated 11/16/21) 100mg BID, continue.  Serial US growth.  NST biweekly Mon Thu  Anemia- Hct 32, oral iron, continue  Elev 1hr- Declined 3hr, glucometer readings wnl, 4/8/22 Ok to stop checking.  4/18 BS log review: F 76-90, 2hr PP - all wnl exept 3.  Hx CX x3- schedule repeat   CHTN Del 37-39+6: 11May 38+0 AP RCS Kangaroo in OR    Orders:  -     POC Urinalysis Dipstick    2. Chronic hypertension affecting pregnancy  -     labetalol (NORMODYNE) 100 MG tablet; Take 1 tablet by mouth 2 (Two) Times a Day.  Dispense: 60 tablet; Refill: 1    3. Gastroesophageal reflux disease without esophagitis  -     famotidine (Pepcid) 20 MG tablet; Take 1 tablet by mouth 2 (Two) Times a Day As Needed for Heartburn.  Dispense: 60 tablet; Refill: 1    Counseling:    • OB precautions, leaking, VB, valentín ornelas vs PTL/Labor  • FKC  • SENT TO L+D for evaluation of BP, NST and Labs.  L+D and on call provider notified.  DW pt,  questions answered.  • Continue PNV.  Importance of healthy eating and staying active.    Return in about 1 week (around 4/25/2022) for Follow up OB q1week to GUILLERMO/Delivery.            Lia Guzman DO  04/18/2022    [facility] [place] [facility]  [address] [place] [address]  Dept: [phone]  Dept Fax: [phone]  Loc: [phone]  Loc Fax: [phone]

## 2022-04-18 ENCOUNTER — HOSPITAL ENCOUNTER (OUTPATIENT)
Dept: LABOR AND DELIVERY | Facility: HOSPITAL | Age: 32
Setting detail: SURGERY ADMIT
Discharge: HOME | End: 2022-04-18

## 2022-04-18 ENCOUNTER — HOSPITAL ENCOUNTER (OUTPATIENT)
Facility: HOSPITAL | Age: 32
Discharge: HOME | End: 2022-04-18
Attending: STUDENT IN AN ORGANIZED HEALTH CARE EDUCATION/TRAINING PROGRAM | Admitting: STUDENT IN AN ORGANIZED HEALTH CARE EDUCATION/TRAINING PROGRAM

## 2022-04-18 ENCOUNTER — ROUTINE PRENATAL (OUTPATIENT)
Dept: OBSTETRICS AND GYNECOLOGY | Facility: CLINIC | Age: 32
End: 2022-04-18

## 2022-04-18 VITALS
SYSTOLIC BLOOD PRESSURE: 130 MMHG | TEMPERATURE: 98.4 F | OXYGEN SATURATION: 100 % | HEART RATE: 80 BPM | DIASTOLIC BLOOD PRESSURE: 85 MMHG | RESPIRATION RATE: 18 BRPM

## 2022-04-18 VITALS — BODY MASS INDEX: 30.49 KG/M2 | DIASTOLIC BLOOD PRESSURE: 99 MMHG | WEIGHT: 164 LBS | SYSTOLIC BLOOD PRESSURE: 146 MMHG

## 2022-04-18 DIAGNOSIS — K21.9 GASTROESOPHAGEAL REFLUX DISEASE WITHOUT ESOPHAGITIS: ICD-10-CM

## 2022-04-18 DIAGNOSIS — R73.09 ELEVATED GLUCOSE TOLERANCE TEST: ICD-10-CM

## 2022-04-18 DIAGNOSIS — O99.019 MATERNAL ANEMIA IN PREGNANCY, ANTEPARTUM: ICD-10-CM

## 2022-04-18 DIAGNOSIS — Z34.80 SUPERVISION OF OTHER NORMAL PREGNANCY, ANTEPARTUM: Primary | ICD-10-CM

## 2022-04-18 DIAGNOSIS — O10.919 CHRONIC HYPERTENSION AFFECTING PREGNANCY: ICD-10-CM

## 2022-04-18 PROBLEM — O99.113 BENIGN GESTATIONAL THROMBOCYTOPENIA IN THIRD TRIMESTER: Status: ACTIVE | Noted: 2022-04-18

## 2022-04-18 PROBLEM — D69.6 BENIGN GESTATIONAL THROMBOCYTOPENIA IN THIRD TRIMESTER (HCC): Status: ACTIVE | Noted: 2022-04-18

## 2022-04-18 LAB
ALBUMIN SERPL-MCNC: 3.4 G/DL (ref 3.5–5.2)
ALBUMIN/GLOB SERPL: 1.2 G/DL
ALP SERPL-CCNC: 133 U/L (ref 39–117)
ALT SERPL W P-5'-P-CCNC: 13 U/L (ref 1–33)
ANION GAP SERPL CALCULATED.3IONS-SCNC: 10.7 MMOL/L (ref 5–15)
AST SERPL-CCNC: 13 U/L (ref 1–32)
BILIRUB SERPL-MCNC: 0.3 MG/DL (ref 0–1.2)
BUN SERPL-MCNC: 3 MG/DL (ref 6–20)
BUN/CREAT SERPL: 5.6 (ref 7–25)
CALCIUM SPEC-SCNC: 8.7 MG/DL (ref 8.6–10.5)
CHLORIDE SERPL-SCNC: 102 MMOL/L (ref 98–107)
CO2 SERPL-SCNC: 24.3 MMOL/L (ref 22–29)
CREAT SERPL-MCNC: 0.54 MG/DL (ref 0.57–1)
CREAT UR-MCNC: 145.2 MG/DL
DEPRECATED RDW RBC AUTO: 42.3 FL (ref 37–54)
EGFRCR SERPLBLD CKD-EPI 2021: 124.9 ML/MIN/1.73
ERYTHROCYTE [DISTWIDTH] IN BLOOD BY AUTOMATED COUNT: 13.6 % (ref 12.3–15.4)
GLOBULIN UR ELPH-MCNC: 2.9 GM/DL
GLUCOSE SERPL-MCNC: 86 MG/DL (ref 65–99)
GLUCOSE UR STRIP-MCNC: NEGATIVE MG/DL
HCT VFR BLD AUTO: 30.1 % (ref 34–46.6)
HGB BLD-MCNC: 9.7 G/DL (ref 12–15.9)
MCH RBC QN AUTO: 28 PG (ref 26.6–33)
MCHC RBC AUTO-ENTMCNC: 32.2 G/DL (ref 31.5–35.7)
MCV RBC AUTO: 87 FL (ref 79–97)
PLATELET # BLD AUTO: 128 10*3/MM3 (ref 140–450)
PMV BLD AUTO: 13.3 FL (ref 6–12)
POTASSIUM SERPL-SCNC: 2.9 MMOL/L (ref 3.5–5.2)
PROT ?TM UR-MCNC: 28.4 MG/DL
PROT SERPL-MCNC: 6.3 G/DL (ref 6–8.5)
PROT UR STRIP-MCNC: NEGATIVE MG/DL
PROT/CREAT UR: 0.2 MG/G{CREAT}
RBC # BLD AUTO: 3.46 10*6/MM3 (ref 3.77–5.28)
SODIUM SERPL-SCNC: 137 MMOL/L (ref 136–145)
WBC NRBC COR # BLD: 6.63 10*3/MM3 (ref 3.4–10.8)

## 2022-04-18 PROCEDURE — 85027 COMPLETE CBC AUTOMATED: CPT | Performed by: OBSTETRICS & GYNECOLOGY

## 2022-04-18 PROCEDURE — 59025 FETAL NON-STRESS TEST: CPT

## 2022-04-18 PROCEDURE — 59025 FETAL NON-STRESS TEST: CPT | Performed by: STUDENT IN AN ORGANIZED HEALTH CARE EDUCATION/TRAINING PROGRAM

## 2022-04-18 PROCEDURE — 80053 COMPREHEN METABOLIC PANEL: CPT | Performed by: OBSTETRICS & GYNECOLOGY

## 2022-04-18 PROCEDURE — 84156 ASSAY OF PROTEIN URINE: CPT | Performed by: OBSTETRICS & GYNECOLOGY

## 2022-04-18 PROCEDURE — 82570 ASSAY OF URINE CREATININE: CPT | Performed by: OBSTETRICS & GYNECOLOGY

## 2022-04-18 PROCEDURE — 99214 OFFICE O/P EST MOD 30 MIN: CPT | Performed by: OBSTETRICS & GYNECOLOGY

## 2022-04-18 RX ORDER — FAMOTIDINE 20 MG/1
20 TABLET, FILM COATED ORAL 2 TIMES DAILY PRN
Qty: 60 TABLET | Refills: 1 | Status: ON HOLD | OUTPATIENT
Start: 2022-04-18 | End: 2022-05-11

## 2022-04-18 RX ORDER — LABETALOL 100 MG/1
100 TABLET, FILM COATED ORAL 2 TIMES DAILY
Qty: 60 TABLET | Refills: 1 | Status: SHIPPED | OUTPATIENT
Start: 2022-04-18 | End: 2022-05-13 | Stop reason: HOSPADM

## 2022-04-18 NOTE — NON STRESS TEST
Obstetrical Non-stress Test Interpretation     Name:  Sia Ramos  MRN: [id number]    32 y.o. female  at 34w5d    Indication:Scheduled NST for CHTN, sent from office for labs      Fetal Assessment  Fetal Movement: active  Fetal HR Assessment Method: external  Fetal HR (beats/min): 135  Fetal HR Baseline: normal range  Fetal HR Variability: moderate (amplitude range 6 to 25 bpm)  Fetal HR Accelerations: greater than/equal to 15 bpm, lasting at least 15 seconds  Fetal HR Decelerations: absent  Sinusoidal Pattern Present: absent  Fetal HR Tracing Category: Category I    /85   Pulse 80   Temp 98.4 °F (36.9 °C) (Oral)   Resp 18   LMP 2021   SpO2 100%     Reason for test: Hypertension (Chronic)  Date of Test: 2022  Time frame of test: 9470-1419  RN NST Interpretation: Nury Egan RN  2022  18:07 EDT

## 2022-04-21 ENCOUNTER — HOSPITAL ENCOUNTER (OUTPATIENT)
Dept: LABOR AND DELIVERY | Facility: HOSPITAL | Age: 32
Setting detail: SURGERY ADMIT
Discharge: HOME | End: 2022-04-21

## 2022-04-21 ENCOUNTER — HOSPITAL ENCOUNTER (OUTPATIENT)
Facility: HOSPITAL | Age: 32
Discharge: HOME | End: 2022-04-21
Attending: OBSTETRICS & GYNECOLOGY | Admitting: OBSTETRICS & GYNECOLOGY

## 2022-04-21 VITALS — SYSTOLIC BLOOD PRESSURE: 132 MMHG | DIASTOLIC BLOOD PRESSURE: 91 MMHG | HEART RATE: 89 BPM

## 2022-04-21 PROCEDURE — 59025 FETAL NON-STRESS TEST: CPT | Performed by: OBSTETRICS & GYNECOLOGY

## 2022-04-21 PROCEDURE — 59025 FETAL NON-STRESS TEST: CPT

## 2022-04-21 NOTE — NON STRESS TEST
[facility] Bhakta   OB NST Note    2022   Name:  Sia Ramos  MRN: [id number]    Subjective:  32 y.o.  at 35w1d    Indication: GHTN    NST:   Baseline: 130  Variability:   Moderate/Normal (amplitude 6-25 bpm)  Accelerations: Present (32 weeks+) 15 x 15 bpm  Decelerations: Absent   Contractions:  Absent    NST interpretation: reactive    Documented Vitals    22 1639   BP: 132/91   Pulse: 89         Lab Results (last 24 hours)     ** No results found for the last 24 hours. **           Assessment:  32 y.o.  AT 35w1d  GHTN    Plan:   OB Precautions, HTN Precautions, FKC, Keep scheduled NSTs, Keep office visit      Electronically signed by Roshan Bishop MD, 22, 7:11 PM EDT.

## 2022-04-21 NOTE — NON STRESS TEST
Obstetrical Non-stress Test Interpretation     Name:  Sia Ramos  MRN: [id number]    32 y.o. female  at 35w1d    Indication: GHTN      Fetal Assessment  Fetal Movement: active  Fetal HR Assessment Method: external  Fetal HR (beats/min): 135  Fetal HR Baseline: normal range  Fetal HR Variability: moderate (amplitude range 6 to 25 bpm)  Fetal HR Accelerations: greater than/equal to 15 bpm, lasting at least 15 seconds  Fetal HR Decelerations: absent  Sinusoidal Pattern Present: absent  Fetal HR Tracing Category: Category I    Blood Pressure 132/91   Pulse 89   Last Menstrual Period 2021     Reason for test: Other (Comment) (NSTfor GHTN on Labetalol Bid +)  Date of Test: 2022  Time frame of test: 4165-3041  RN NST Interpretation: Nury Starr RN  2022  17:13 EDT

## 2022-04-25 ENCOUNTER — HOSPITAL ENCOUNTER (OUTPATIENT)
Facility: HOSPITAL | Age: 32
Discharge: HOME | End: 2022-04-25
Attending: STUDENT IN AN ORGANIZED HEALTH CARE EDUCATION/TRAINING PROGRAM | Admitting: STUDENT IN AN ORGANIZED HEALTH CARE EDUCATION/TRAINING PROGRAM

## 2022-04-25 VITALS
HEART RATE: 89 BPM | DIASTOLIC BLOOD PRESSURE: 91 MMHG | RESPIRATION RATE: 18 BRPM | TEMPERATURE: 98.4 F | OXYGEN SATURATION: 99 % | SYSTOLIC BLOOD PRESSURE: 133 MMHG

## 2022-04-25 PROCEDURE — 59025 FETAL NON-STRESS TEST: CPT | Performed by: STUDENT IN AN ORGANIZED HEALTH CARE EDUCATION/TRAINING PROGRAM

## 2022-04-25 PROCEDURE — 59025 FETAL NON-STRESS TEST: CPT

## 2022-04-25 PROCEDURE — G0463 HOSPITAL OUTPT CLINIC VISIT: HCPCS

## 2022-04-25 NOTE — NON STRESS TEST
Obstetrical Non-stress Test Interpretation     Name:  Sia Ramos  MRN: [id number]    32 y.o. female  at 35w5d    Indication: Chronic hypertension      Fetal Assessment  Fetal Movement: active  Fetal HR Assessment Method: external  Fetal HR (beats/min): 145  Fetal HR Baseline: normal range  Fetal HR Variability: moderate (amplitude range 6 to 25 bpm)  Fetal HR Accelerations: greater than/equal to 15 bpm, lasting at least 15 seconds  Fetal HR Decelerations: absent  Sinusoidal Pattern Present: absent  Fetal HR Tracing Category: Category I    /91 (BP Location: Right arm, Patient Position: Lying)   Pulse 89   Temp 98.4 °F (36.9 °C) (Oral)   Resp 18   LMP 2021   SpO2 99%     Reason for test: Hypertension  Date of Test: 2022  Time frame of test: 7544-3739  RN NST Interpretation: Reactive      Luana Alvarez RN  2022  14:30 EDT

## 2022-04-25 NOTE — PROGRESS NOTES
OB FOLLOW UP      Chief Complaint   Patient presents with   • Routine Prenatal Visit     Subjective:   • Csection scar w 1week occas numb or tingling, burning.  No deep pain or abdominal pain.  • Denies HA, vision changes, edema or RUQ pain    Objective:  /82   Wt 73.9 kg (163 lb)   LMP 08/18/2021   BMI 30.30 kg/m²  15.4 kg (34 lb)  Uterine Size: size equals dates  FHT: 110-160 BPM  Abd soft, CS scar non tender  Pelvic exam: GBS RV swab performed today  See OB flow for edema, cvx exam if performed, and Upro/Uglu    Assessment and Plan:  35w5d  Reassuring pregnancy progress.  Questions answered.  Diagnoses and all orders for this visit:    1. Supervision of other normal pregnancy, antepartum (Primary)  Overview:  GUILLERMO finalized: 5/25/22 by LMP and anatomy US    NIPS negative, gender male    COVID: 4/18/2022 recommended, declines, considering PP  Influenza: vaccinated  Tdap: vaccinated    ?BTL/Bilat salpingectomy: No    Anatomy: No placenta previa, posterior  FU US: 2/21/22 CWD   FU US: 3/22/2022 wnl, 3#12oz, 58%ile  FU US: 4/18/2022 VTX 5#5oz, 53%ile    PROBLEM LIST/PLAN:   CHTN - ASA 81mg: stop on 1may , Labetalol (initiated 11/16/21) 100mg BID, continue.  NST biweekly Mon Thu  Anemia- Hct 32, oral iron, continue  Elev 1hr- Declined 3hr, glucometer readings wnl, 4/18/22 Ok to stop checking.    Hx CX x3- schedule repeat   CHTN Del 37-39+6: 11May 38+0 AP RCS Kangaroo in OR    Orders:  -     Group B Streptococcus Culture - Swab, Vaginal/Rectum  -     POC Urinalysis Dipstick    Counseling:    • OB precautions, leaking, VB, valentín ornelas vs PTL/Labor  • FKC  • HTN precautions, HA, vision change, RUQ/epigastric pain, edema  • Continue PNV.  Importance of healthy eating and staying active.    Return in about 1 week (around 5/3/2022) for Follow up OB.            Lia Guzman DO  04/26/2022    [facility] [place] [facility]  [address] [place] [address]  Dept: [phone]  Dept  Fax: [phone]  Loc: [phone]  Loc Fax: [phone]

## 2022-04-26 ENCOUNTER — ROUTINE PRENATAL (OUTPATIENT)
Dept: OBSTETRICS AND GYNECOLOGY | Facility: CLINIC | Age: 32
End: 2022-04-26

## 2022-04-26 VITALS — DIASTOLIC BLOOD PRESSURE: 82 MMHG | BODY MASS INDEX: 30.3 KG/M2 | WEIGHT: 163 LBS | SYSTOLIC BLOOD PRESSURE: 136 MMHG

## 2022-04-26 DIAGNOSIS — O99.113 BENIGN GESTATIONAL THROMBOCYTOPENIA IN THIRD TRIMESTER: ICD-10-CM

## 2022-04-26 DIAGNOSIS — D69.6 BENIGN GESTATIONAL THROMBOCYTOPENIA IN THIRD TRIMESTER: ICD-10-CM

## 2022-04-26 DIAGNOSIS — O99.019 MATERNAL ANEMIA IN PREGNANCY, ANTEPARTUM: ICD-10-CM

## 2022-04-26 DIAGNOSIS — Z34.80 SUPERVISION OF OTHER NORMAL PREGNANCY, ANTEPARTUM: Primary | ICD-10-CM

## 2022-04-26 DIAGNOSIS — R73.09 ELEVATED GLUCOSE TOLERANCE TEST: ICD-10-CM

## 2022-04-26 LAB
GLUCOSE UR STRIP-MCNC: NEGATIVE MG/DL
PROT UR STRIP-MCNC: NEGATIVE MG/DL

## 2022-04-26 PROCEDURE — 87081 CULTURE SCREEN ONLY: CPT | Performed by: OBSTETRICS & GYNECOLOGY

## 2022-04-26 PROCEDURE — 99214 OFFICE O/P EST MOD 30 MIN: CPT | Performed by: OBSTETRICS & GYNECOLOGY

## 2022-04-27 RX ORDER — BLOOD-GLUCOSE METER
KIT MISCELLANEOUS
Qty: 100 EACH | Refills: 0 | Status: ON HOLD | OUTPATIENT
Start: 2022-04-27 | End: 2022-05-11

## 2022-04-27 RX ORDER — LANCETS 28 GAUGE
EACH MISCELLANEOUS
Qty: 100 EACH | Refills: 0 | Status: SHIPPED | OUTPATIENT
Start: 2022-04-27

## 2022-04-28 ENCOUNTER — HOSPITAL ENCOUNTER (OUTPATIENT)
Facility: HOSPITAL | Age: 32
Discharge: HOME | End: 2022-04-28
Attending: OBSTETRICS & GYNECOLOGY | Admitting: OBSTETRICS & GYNECOLOGY

## 2022-04-28 ENCOUNTER — HOSPITAL ENCOUNTER (OUTPATIENT)
Dept: LABOR AND DELIVERY | Facility: HOSPITAL | Age: 32
Setting detail: SURGERY ADMIT
Discharge: HOME | End: 2022-04-28

## 2022-04-28 VITALS
HEART RATE: 79 BPM | DIASTOLIC BLOOD PRESSURE: 85 MMHG | SYSTOLIC BLOOD PRESSURE: 129 MMHG | TEMPERATURE: 98.3 F | RESPIRATION RATE: 16 BRPM | OXYGEN SATURATION: 99 %

## 2022-04-28 PROBLEM — O16.9 ELEVATED BLOOD PRESSURE AFFECTING PREGNANCY, ANTEPARTUM: Status: ACTIVE | Noted: 2022-04-28

## 2022-04-28 LAB
ALBUMIN SERPL-MCNC: 3.7 G/DL (ref 3.5–5.2)
ALBUMIN/GLOB SERPL: 1.2 G/DL
ALP SERPL-CCNC: 151 U/L (ref 39–117)
ALT SERPL W P-5'-P-CCNC: 12 U/L (ref 1–33)
ANION GAP SERPL CALCULATED.3IONS-SCNC: 11.9 MMOL/L (ref 5–15)
AST SERPL-CCNC: 17 U/L (ref 1–32)
BACTERIA SPEC AEROBE CULT: NORMAL
BILIRUB SERPL-MCNC: 0.6 MG/DL (ref 0–1.2)
BUN SERPL-MCNC: 4 MG/DL (ref 6–20)
BUN/CREAT SERPL: 7.8 (ref 7–25)
CALCIUM SPEC-SCNC: 8.9 MG/DL (ref 8.6–10.5)
CHLORIDE SERPL-SCNC: 101 MMOL/L (ref 98–107)
CO2 SERPL-SCNC: 25.1 MMOL/L (ref 22–29)
CREAT SERPL-MCNC: 0.51 MG/DL (ref 0.57–1)
CREAT UR-MCNC: 102.6 MG/DL
DEPRECATED RDW RBC AUTO: 43.6 FL (ref 37–54)
EGFRCR SERPLBLD CKD-EPI 2021: 126.6 ML/MIN/1.73
ERYTHROCYTE [DISTWIDTH] IN BLOOD BY AUTOMATED COUNT: 14 % (ref 12.3–15.4)
GLOBULIN UR ELPH-MCNC: 3.2 GM/DL
GLUCOSE SERPL-MCNC: 73 MG/DL (ref 65–99)
HCT VFR BLD AUTO: 31.3 % (ref 34–46.6)
HGB BLD-MCNC: 10.1 G/DL (ref 12–15.9)
MCH RBC QN AUTO: 27.6 PG (ref 26.6–33)
MCHC RBC AUTO-ENTMCNC: 32.3 G/DL (ref 31.5–35.7)
MCV RBC AUTO: 85.5 FL (ref 79–97)
PLATELET # BLD AUTO: 137 10*3/MM3 (ref 140–450)
PMV BLD AUTO: 13.4 FL (ref 6–12)
POTASSIUM SERPL-SCNC: 3.1 MMOL/L (ref 3.5–5.2)
PROT ?TM UR-MCNC: 18.4 MG/DL
PROT SERPL-MCNC: 6.9 G/DL (ref 6–8.5)
PROT/CREAT UR: 0.18 MG/G{CREAT}
RBC # BLD AUTO: 3.66 10*6/MM3 (ref 3.77–5.28)
SODIUM SERPL-SCNC: 138 MMOL/L (ref 136–145)
WBC NRBC COR # BLD: 5.72 10*3/MM3 (ref 3.4–10.8)

## 2022-04-28 PROCEDURE — 80053 COMPREHEN METABOLIC PANEL: CPT | Performed by: OBSTETRICS & GYNECOLOGY

## 2022-04-28 PROCEDURE — 84156 ASSAY OF PROTEIN URINE: CPT | Performed by: OBSTETRICS & GYNECOLOGY

## 2022-04-28 PROCEDURE — 59025 FETAL NON-STRESS TEST: CPT | Performed by: OBSTETRICS & GYNECOLOGY

## 2022-04-28 PROCEDURE — 82570 ASSAY OF URINE CREATININE: CPT | Performed by: OBSTETRICS & GYNECOLOGY

## 2022-04-28 PROCEDURE — 59025 FETAL NON-STRESS TEST: CPT

## 2022-04-28 PROCEDURE — 63710000001 ONDANSETRON ODT 4 MG TABLET DISPERSIBLE: Performed by: OBSTETRICS & GYNECOLOGY

## 2022-04-28 PROCEDURE — 85027 COMPLETE CBC AUTOMATED: CPT | Performed by: OBSTETRICS & GYNECOLOGY

## 2022-04-28 RX ORDER — ONDANSETRON 4 MG/1
4 TABLET, ORALLY DISINTEGRATING ORAL EVERY 6 HOURS PRN
Qty: 20 TABLET | Refills: 0 | Status: SHIPPED | OUTPATIENT
Start: 2022-04-28 | End: 2022-05-10 | Stop reason: SDUPTHER

## 2022-04-28 RX ORDER — ONDANSETRON 4 MG/1
4 TABLET, ORALLY DISINTEGRATING ORAL ONCE
Status: COMPLETED | OUTPATIENT
Start: 2022-04-28 | End: 2022-04-28

## 2022-04-28 RX ORDER — LABETALOL 100 MG/1
100 TABLET, FILM COATED ORAL ONCE
Status: COMPLETED | OUTPATIENT
Start: 2022-04-28 | End: 2022-04-28

## 2022-04-28 RX ADMIN — ONDANSETRON 4 MG: 4 TABLET, ORALLY DISINTEGRATING ORAL at 17:32

## 2022-04-28 RX ADMIN — LABETALOL HYDROCHLORIDE 100 MG: 100 TABLET, FILM COATED ORAL at 17:55

## 2022-05-01 NOTE — PROGRESS NOTES
OB FOLLOW UP      Chief Complaint   Patient presents with   • Routine Prenatal Visit     Subjective:   • No complaints    Objective:  /84   Wt 73 kg (161 lb)   LMP 08/18/2021   BMI 29.93 kg/m²  14.5 kg (32 lb)  Uterine Size: size equals dates  FHT: 110-160 BPM    See OB flow for edema, cvx exam if performed, and Upro/Uglu    Assessment and Plan:  36w4d  Reassuring pregnancy progress.  Questions answered.  Diagnoses and all orders for this visit:    1. Supervision of other normal pregnancy, antepartum (Primary)  Overview:  GUILLERMO finalized: 5/25/22 by LMP and anatomy US    NIPS negative, gender male    COVID: declines, considering PP  Influenza: vaccinated  Tdap: vaccinated    ?BTL/Bilat salpingectomy: No    FU US: 4/18/2022 VTX 5#5oz, 53%ile    PROBLEM LIST/PLAN:   CHTN - stopped ASA on 1may , Labetalol (initiated 11/16/21) 100mg BID, continue.  NST biweekly Mon Thu  Anemia- Hct 32, oral iron, continue  Elev 1hr- Declined 3hr, nl glucometer readings, no need to check anymore.  Hx CX x3- 11May 38+0 AP RCS Kangaroo in OR @ 1:30  CHTN Del 37-39+6  Gest thrombocytopenia- 4/18 plts 128    Orders:  -     POC Urinalysis Dipstick      Counseling:    • OB precautions, leaking, VB, valentín ornelas vs PTL/Labor  • FKC  • HTN precautions, HA, vision change, RUQ/epigastric pain, edema  • Continue PNV.  Importance of healthy eating and staying active.    Return in about 1 week (around 5/9/2022) for Follow up OB.            Lia Guzman,   05/02/2022    [facility] [place] [facility]  [address] [place] [address]  Dept: [phone]  Dept Fax: [phone]  Loc: [phone]  Loc Fax: [phone]

## 2022-05-02 ENCOUNTER — HOSPITAL ENCOUNTER (OUTPATIENT)
Facility: HOSPITAL | Age: 32
Discharge: HOME | End: 2022-05-02
Attending: STUDENT IN AN ORGANIZED HEALTH CARE EDUCATION/TRAINING PROGRAM | Admitting: STUDENT IN AN ORGANIZED HEALTH CARE EDUCATION/TRAINING PROGRAM

## 2022-05-02 ENCOUNTER — ROUTINE PRENATAL (OUTPATIENT)
Dept: OBSTETRICS AND GYNECOLOGY | Facility: CLINIC | Age: 32
End: 2022-05-02

## 2022-05-02 VITALS — SYSTOLIC BLOOD PRESSURE: 134 MMHG | DIASTOLIC BLOOD PRESSURE: 84 MMHG | BODY MASS INDEX: 29.93 KG/M2 | WEIGHT: 161 LBS

## 2022-05-02 VITALS — SYSTOLIC BLOOD PRESSURE: 131 MMHG | RESPIRATION RATE: 18 BRPM | DIASTOLIC BLOOD PRESSURE: 88 MMHG | HEART RATE: 81 BPM

## 2022-05-02 DIAGNOSIS — Z34.80 SUPERVISION OF OTHER NORMAL PREGNANCY, ANTEPARTUM: Primary | ICD-10-CM

## 2022-05-02 DIAGNOSIS — R73.09 ELEVATED GLUCOSE TOLERANCE TEST: ICD-10-CM

## 2022-05-02 LAB
BILIRUB BLD-MCNC: NEGATIVE MG/DL
GLUCOSE UR STRIP-MCNC: NEGATIVE MG/DL
KETONES UR QL: ABNORMAL
LEUKOCYTE EST, POC: ABNORMAL
NITRITE UR-MCNC: NEGATIVE MG/ML
PH UR: 7 [PH] (ref 5–8)
PROT UR STRIP-MCNC: ABNORMAL MG/DL
RBC # UR STRIP: NEGATIVE /UL
SP GR UR: 1.01 (ref 1–1.03)
UROBILINOGEN UR QL: ABNORMAL

## 2022-05-02 PROCEDURE — 99214 OFFICE O/P EST MOD 30 MIN: CPT | Performed by: OBSTETRICS & GYNECOLOGY

## 2022-05-02 PROCEDURE — 59025 FETAL NON-STRESS TEST: CPT | Performed by: STUDENT IN AN ORGANIZED HEALTH CARE EDUCATION/TRAINING PROGRAM

## 2022-05-02 PROCEDURE — 59025 FETAL NON-STRESS TEST: CPT

## 2022-05-02 PROCEDURE — G0463 HOSPITAL OUTPT CLINIC VISIT: HCPCS

## 2022-05-02 NOTE — NON STRESS TEST
Obstetrical Non-stress Test Interpretation     Name:  Sia Ramos  MRN: [id number]    32 y.o. female  at 36w5d    Indication: chronic hypertension    Fetal Movement: active  Fetal HR Assessment Method: external  Fetal HR (beats/min): 145  Fetal HR Baseline: normal range  Fetal HR Variability: moderate (amplitude range 6 to 25 bpm)  Fetal HR Accelerations: episodic, greater than/equal to 15 bpm, lasting at least 15 seconds  Fetal HR Decelerations: absent  Sinusoidal Pattern Present: absent    Patient Vitals for the past 24 hrs:   BP Pulse Resp   22 1710 131/88 81 --   22 1705 145/91 80 18       /88 (BP Location: Right arm, Patient Position: Lying)   Pulse 81   Resp 18   LMP 2021     Reason for test: Hypertension  Date of Test: 2022  Time frame of test: 6513-2400  RN NST Interpretation: Reactive      Alem Meyers RN  2022  17:28 EDT

## 2022-05-05 ENCOUNTER — TRANSCRIBE ORDERS (OUTPATIENT)
Dept: LAB | Facility: HOSPITAL | Age: 32
End: 2022-05-05

## 2022-05-05 DIAGNOSIS — Z01.818 PREOP TESTING: Primary | ICD-10-CM

## 2022-05-06 ENCOUNTER — TELEPHONE (OUTPATIENT)
Dept: OBSTETRICS AND GYNECOLOGY | Facility: CLINIC | Age: 32
End: 2022-05-06

## 2022-05-09 ENCOUNTER — LAB (OUTPATIENT)
Dept: LAB | Facility: HOSPITAL | Age: 32
End: 2022-05-09

## 2022-05-09 DIAGNOSIS — Z01.818 PREOP TESTING: ICD-10-CM

## 2022-05-09 LAB — SARS-COV-2 RNA PNL SPEC NAA+PROBE: NOT DETECTED

## 2022-05-09 PROCEDURE — U0004 COV-19 TEST NON-CDC HGH THRU: HCPCS

## 2022-05-10 ENCOUNTER — PREP FOR SURGERY (OUTPATIENT)
Dept: OTHER | Facility: HOSPITAL | Age: 32
End: 2022-05-10

## 2022-05-10 ENCOUNTER — ROUTINE PRENATAL (OUTPATIENT)
Dept: OBSTETRICS AND GYNECOLOGY | Facility: CLINIC | Age: 32
End: 2022-05-10

## 2022-05-10 VITALS — DIASTOLIC BLOOD PRESSURE: 90 MMHG | BODY MASS INDEX: 29.18 KG/M2 | SYSTOLIC BLOOD PRESSURE: 132 MMHG | WEIGHT: 157 LBS

## 2022-05-10 DIAGNOSIS — O10.919 CHRONIC HYPERTENSION AFFECTING PREGNANCY: Primary | ICD-10-CM

## 2022-05-10 DIAGNOSIS — R73.09 ELEVATED GLUCOSE TOLERANCE TEST: ICD-10-CM

## 2022-05-10 DIAGNOSIS — Z98.891 HISTORY OF 3 CESAREAN SECTIONS: ICD-10-CM

## 2022-05-10 DIAGNOSIS — Z34.80 SUPERVISION OF OTHER NORMAL PREGNANCY, ANTEPARTUM: Primary | ICD-10-CM

## 2022-05-10 LAB
GLUCOSE UR STRIP-MCNC: NEGATIVE MG/DL
PROT UR STRIP-MCNC: ABNORMAL MG/DL

## 2022-05-10 PROCEDURE — 99214 OFFICE O/P EST MOD 30 MIN: CPT | Performed by: OBSTETRICS & GYNECOLOGY

## 2022-05-10 RX ORDER — SODIUM CHLORIDE, SODIUM LACTATE, POTASSIUM CHLORIDE, CALCIUM CHLORIDE 600; 310; 30; 20 MG/100ML; MG/100ML; MG/100ML; MG/100ML
150 INJECTION, SOLUTION INTRAVENOUS CONTINUOUS
Status: CANCELLED | OUTPATIENT
Start: 2022-05-10

## 2022-05-10 RX ORDER — TRISODIUM CITRATE DIHYDRATE AND CITRIC ACID MONOHYDRATE 500; 334 MG/5ML; MG/5ML
30 SOLUTION ORAL ONCE
Status: CANCELLED | OUTPATIENT
Start: 2022-05-10 | End: 2022-05-10

## 2022-05-10 RX ORDER — ONDANSETRON 2 MG/ML
4 INJECTION INTRAMUSCULAR; INTRAVENOUS EVERY 6 HOURS PRN
Status: CANCELLED | OUTPATIENT
Start: 2022-05-10

## 2022-05-10 RX ORDER — ONDANSETRON 4 MG/1
4 TABLET, FILM COATED ORAL EVERY 6 HOURS PRN
Status: CANCELLED | OUTPATIENT
Start: 2022-05-10

## 2022-05-10 RX ORDER — OXYTOCIN/0.9 % SODIUM CHLORIDE 30/500 ML
125 PLASTIC BAG, INJECTION (ML) INTRAVENOUS ONCE
Status: CANCELLED | OUTPATIENT
Start: 2022-05-10 | End: 2022-05-10

## 2022-05-10 RX ORDER — FAMOTIDINE 10 MG/ML
20 INJECTION, SOLUTION INTRAVENOUS ONCE AS NEEDED
Status: CANCELLED | OUTPATIENT
Start: 2022-05-10

## 2022-05-10 RX ORDER — ACETAMINOPHEN 325 MG/1
650 TABLET ORAL ONCE AS NEEDED
Status: CANCELLED | OUTPATIENT
Start: 2022-05-10

## 2022-05-10 RX ORDER — LIDOCAINE HYDROCHLORIDE 10 MG/ML
5 INJECTION, SOLUTION EPIDURAL; INFILTRATION; INTRACAUDAL; PERINEURAL AS NEEDED
Status: CANCELLED | OUTPATIENT
Start: 2022-05-10

## 2022-05-10 RX ORDER — FAMOTIDINE 20 MG/1
20 TABLET, FILM COATED ORAL ONCE AS NEEDED
Status: CANCELLED | OUTPATIENT
Start: 2022-05-10

## 2022-05-10 RX ORDER — SODIUM CHLORIDE 0.9 % (FLUSH) 0.9 %
3 SYRINGE (ML) INJECTION EVERY 12 HOURS SCHEDULED
Status: CANCELLED | OUTPATIENT
Start: 2022-05-10

## 2022-05-10 RX ORDER — METOCLOPRAMIDE HYDROCHLORIDE 5 MG/ML
10 INJECTION INTRAMUSCULAR; INTRAVENOUS ONCE AS NEEDED
Status: CANCELLED | OUTPATIENT
Start: 2022-05-10

## 2022-05-10 RX ORDER — SODIUM CHLORIDE 0.9 % (FLUSH) 0.9 %
10 SYRINGE (ML) INJECTION AS NEEDED
Status: CANCELLED | OUTPATIENT
Start: 2022-05-10

## 2022-05-10 RX ORDER — CEFAZOLIN SODIUM 2 G/100ML
2 INJECTION, SOLUTION INTRAVENOUS ONCE
Status: CANCELLED | OUTPATIENT
Start: 2022-05-10 | End: 2022-05-10

## 2022-05-10 RX ORDER — MISOPROSTOL 200 UG/1
800 TABLET ORAL AS NEEDED
Status: CANCELLED | OUTPATIENT
Start: 2022-05-10

## 2022-05-10 NOTE — H&P (VIEW-ONLY)
OB HISTORY AND PHYSICAL      SUBJECTIVE:    32 y.o. female  currently at 37w6d [facility] complicated by:      Patient Active Problem List    Diagnosis    • Elevated blood pressure affecting pregnancy, antepartum [O16.9]    • Gastroesophageal reflux disease without esophagitis [K21.9]    • Benign gestational thrombocytopenia in third trimester (HCC) [O99.113, D69.6]    • Maternal anemia in pregnancy, antepartum [O99.019]    • Elevated glucose tolerance test- unable to get 3hr GTT [R73.09]    • Chronic hypertension affecting pregnancy [O10.919]    • History of 3  sections [Z98.891]    • Supervision of other normal pregnancy, antepartum [Z34.80]        CC/HPI:  Presents with Scheduled CS.     ROS: No leaking fluid, No vaginal bleeding, No contractions and Adequate FM    Past OB History:   OB History    Para Term  AB Living   4 3 3     3   SAB IAB Ectopic Molar Multiple Live Births             3      # Outcome Date GA Lbr Cheko/2nd Weight Sex Delivery Anes PTL Lv   4 Current            3 Term  39w0d   F CS-Unspec   EMMA   2 Term  39w0d   M CS-Unspec   EMMA   1 Term  38w0d   M CS-Unspec   EMMA        Prenatal Labs:    External Prenatal Results     Pregnancy Outside Results - Transcribed From Office Records - See Scanned Records For Details     Test Value Date Time    ABO  O  10/21/21 1422    Rh  Positive  10/21/21 1422    Antibody Screen  Negative  10/21/21 1422    Varicella IgG       Rubella  5.43 index 10/21/21 1422    Hgb  10.1 g/dL 22 1744       9.7 g/dL 22 1455       10.8 g/dL 02/10/22 1150       13.3 g/dL 10/21/21 1422    Hct  31.3 % 22 1744       30.1 % 22 1455       32.0 % 02/10/22 1150       39.6 % 10/21/21 1422    Glucose Fasting GTT       Glucose Tolerance Test 1 hour       Glucose Tolerance Test 3 hour       Gonorrhea (discrete)  Negative  10/21/21 1408    Chlamydia (discrete)  Negative  10/21/21 1408    RPR  Non-Reactive  10/21/21 1422    VDRL        Syphilis Antibody       HBsAg  Non-Reactive  10/21/21 1422    Herpes Simplex Virus PCR       Herpes Simplex VIrus Culture       HIV  Non-Reactive  10/21/21 1422    Hep C RNA Quant PCR       Hep C Antibody  Non-Reactive  10/21/21 1422    AFP  43.7 ng/mL 21 1529    Group B Strep  No Group B Streptococcus isolated  22 1319    GBS Susceptibility to Clindamycin       GBS Susceptibility to Erythromycin       Fetal Fibronectin       Genetic Testing, Maternal Blood                PMHx:    Past Medical History:   Diagnosis Date   • Essential hypertension 2021   • Migraine        Home Medications:  Alcohol Swabs, famotidine, ferrous sulfate, freestyle, glucose blood, glucose monitor, labetalol, prenatal vitamin, and sertraline    Allergies:  No Known Allergies    PSHx:    Past Surgical History:   Procedure Laterality Date   •  SECTION      x3       Social History:    reports that she has never smoked. She has never used smokeless tobacco. She reports that she does not drink alcohol and does not use drugs.    Family History: Non contributory    Immunizations: See prenatal record for Tdap, Flu, Covid and/or other vaccinations    PHYSICAL EXAM:  BP: (132)/(90) 132/90  General- NAD, alert and oriented, appropriate  Psych- normal mood, good memory  CV- Regular rhythm, no murnurs  Resp- CTA to bases, no wheezes  Abdomen- Gravid, non tender  Fundus-  Non tender.  Size: consistent with dates  Presentation- VTX  Ext/DTR: bilaterally equal, no signs of DVT    Fetal HR: Doptones 110-160, see last OV note  Contractions: Not shannon    ASSESSMENT:  37w6d  Scheduled CS, repeat  GBS: Negative  CHTN on labetalol    PLAN:  Admit  Delivery: , declines BTL    Plan of care NLT hospital course, R/B/A/potential SE, suspected length have been reviewed with patient and any family or friends present, questions answered to her/his/their satisfaction.  Pt desires to proceed as above.    Counseling:The patient was  counseled on the risks, benefits and alternatives of a .  Risks reviewed, but are not limited to: anesthesia, bleeding, transfusion, infection, damage to organs, reoperation, wound separation, blood clots, and death.  She understands w 3 prior CS these risks are increased especially damage to organs if scar tissue is present.  She declines the alternatives and desires a .  All her questions have been answered to her satisfaction and she desires to proceed.          Electronically signed by Lia Guzman DO, 05/10/22, 5:26 PM EDT.    [facility]  [address]  [place] [address]  Dept: [phone]  Loc: [phone]

## 2022-05-10 NOTE — PROGRESS NOTES
OB FOLLOW UP      Chief Complaint   Patient presents with   • Routine Prenatal Visit     Subjective:   • No complaints    Objective:  /90   Wt 71.2 kg (157 lb)   LMP 08/18/2021   BMI 29.18 kg/m²  12.7 kg (28 lb)  Uterine Size: size equals dates  FHT: 110-160 BPM    See OB flow for edema, cvx exam if performed, and Upro/Uglu    Assessment and Plan:  37w5d  Reassuring pregnancy progress.  Questions answered.  Diagnoses and all orders for this visit:    1. Supervision of other normal pregnancy, antepartum (Primary)  Overview:  GUILLERMO finalized: 5/25/22 by LMP and anatomy US    NIPS negative, gender male    COVID: declines, considering PP  Influenza: vaccinated  Tdap: vaccinated    ?BTL/Bilat salpingectomy: No    FU US: 4/18/2022 VTX 5#5oz, 53%ile    PROBLEM LIST/PLAN:   CHTN - stopped ASA on 1may , Labetalol (initiated 11/16/21) 100mg BID, continue.  NST biweekly Mon Thu  Anemia- Hct 32, oral iron, continue  Elev 1hr- Declined 3hr, nl glucometer readings, no need to check anymore.  Hx CX x3- 11May 38+0 AP RCS Kangaroo in OR @ 1:30  CHTN Del 37-39+6  Gest thrombocytopenia- 4/18 plts 128    Orders:  -     POC Urinalysis Dipstick    Counseling:    • OB precautions, leaking, VB, valentín ornelas vs PTL/Labor  • FKC  • CS reviewed in detail.  All history reviewed and updated.  Preop exam performed.  See separate scanned in counseling note.  All questions answered.  She desires to proceed as planned.   • Continue PNV.  Importance of healthy eating and staying active.    Return in about 5 weeks (around 6/14/2022) for Postpartum FU.            Lia Guzman, DO  05/10/2022    [facility] [place] [facility]  [address] [place] [address]  Dept: [phone]  Dept Fax: [phone]  Loc: [phone]  Loc Fax: [phone]

## 2022-05-10 NOTE — H&P
OB HISTORY AND PHYSICAL      SUBJECTIVE:    32 y.o. female  currently at 37w6d [facility] complicated by:      Patient Active Problem List    Diagnosis    • Elevated blood pressure affecting pregnancy, antepartum [O16.9]    • Gastroesophageal reflux disease without esophagitis [K21.9]    • Benign gestational thrombocytopenia in third trimester (HCC) [O99.113, D69.6]    • Maternal anemia in pregnancy, antepartum [O99.019]    • Elevated glucose tolerance test- unable to get 3hr GTT [R73.09]    • Chronic hypertension affecting pregnancy [O10.919]    • History of 3  sections [Z98.891]    • Supervision of other normal pregnancy, antepartum [Z34.80]        CC/HPI:  Presents with Scheduled CS.     ROS: No leaking fluid, No vaginal bleeding, No contractions and Adequate FM    Past OB History:   OB History    Para Term  AB Living   4 3 3     3   SAB IAB Ectopic Molar Multiple Live Births             3      # Outcome Date GA Lbr Cheko/2nd Weight Sex Delivery Anes PTL Lv   4 Current            3 Term  39w0d   F CS-Unspec   EMMA   2 Term  39w0d   M CS-Unspec   EMMA   1 Term  38w0d   M CS-Unspec   EMMA        Prenatal Labs:    External Prenatal Results     Pregnancy Outside Results - Transcribed From Office Records - See Scanned Records For Details     Test Value Date Time    ABO  O  10/21/21 1422    Rh  Positive  10/21/21 1422    Antibody Screen  Negative  10/21/21 1422    Varicella IgG       Rubella  5.43 index 10/21/21 1422    Hgb  10.1 g/dL 22 1744       9.7 g/dL 22 1455       10.8 g/dL 02/10/22 1150       13.3 g/dL 10/21/21 1422    Hct  31.3 % 22 1744       30.1 % 22 1455       32.0 % 02/10/22 1150       39.6 % 10/21/21 1422    Glucose Fasting GTT       Glucose Tolerance Test 1 hour       Glucose Tolerance Test 3 hour       Gonorrhea (discrete)  Negative  10/21/21 1408    Chlamydia (discrete)  Negative  10/21/21 1408    RPR  Non-Reactive  10/21/21 1422    VDRL        Syphilis Antibody       HBsAg  Non-Reactive  10/21/21 1422    Herpes Simplex Virus PCR       Herpes Simplex VIrus Culture       HIV  Non-Reactive  10/21/21 1422    Hep C RNA Quant PCR       Hep C Antibody  Non-Reactive  10/21/21 1422    AFP  43.7 ng/mL 21 1529    Group B Strep  No Group B Streptococcus isolated  22 1319    GBS Susceptibility to Clindamycin       GBS Susceptibility to Erythromycin       Fetal Fibronectin       Genetic Testing, Maternal Blood                PMHx:    Past Medical History:   Diagnosis Date   • Essential hypertension 2021   • Migraine        Home Medications:  Alcohol Swabs, famotidine, ferrous sulfate, freestyle, glucose blood, glucose monitor, labetalol, prenatal vitamin, and sertraline    Allergies:  No Known Allergies    PSHx:    Past Surgical History:   Procedure Laterality Date   •  SECTION      x3       Social History:    reports that she has never smoked. She has never used smokeless tobacco. She reports that she does not drink alcohol and does not use drugs.    Family History: Non contributory    Immunizations: See prenatal record for Tdap, Flu, Covid and/or other vaccinations    PHYSICAL EXAM:  BP: (132)/(90) 132/90  General- NAD, alert and oriented, appropriate  Psych- normal mood, good memory  CV- Regular rhythm, no murnurs  Resp- CTA to bases, no wheezes  Abdomen- Gravid, non tender  Fundus-  Non tender.  Size: consistent with dates  Presentation- VTX  Ext/DTR: bilaterally equal, no signs of DVT    Fetal HR: Doptones 110-160, see last OV note  Contractions: Not shannon    ASSESSMENT:  37w6d  Scheduled CS, repeat  GBS: Negative  CHTN on labetalol    PLAN:  Admit  Delivery: , declines BTL    Plan of care NLT hospital course, R/B/A/potential SE, suspected length have been reviewed with patient and any family or friends present, questions answered to her/his/their satisfaction.  Pt desires to proceed as above.    Counseling:The patient was  counseled on the risks, benefits and alternatives of a .  Risks reviewed, but are not limited to: anesthesia, bleeding, transfusion, infection, damage to organs, reoperation, wound separation, blood clots, and death.  She understands w 3 prior CS these risks are increased especially damage to organs if scar tissue is present.  She declines the alternatives and desires a .  All her questions have been answered to her satisfaction and she desires to proceed.          Electronically signed by Lia Guzman DO, 05/10/22, 5:26 PM EDT.    [facility]  [address] [facility] [address]  [place] [address]  Dept: [phone]  Loc: [phone]

## 2022-05-11 ENCOUNTER — HOSPITAL ENCOUNTER (INPATIENT)
Facility: HOSPITAL | Age: 32
LOS: 2 days | Discharge: HOME | End: 2022-05-13
Attending: OBSTETRICS & GYNECOLOGY | Admitting: OBSTETRICS & GYNECOLOGY

## 2022-05-11 ENCOUNTER — ANESTHESIA EVENT (OUTPATIENT)
Dept: LABOR AND DELIVERY | Facility: HOSPITAL | Age: 32
End: 2022-05-11

## 2022-05-11 ENCOUNTER — ANESTHESIA (OUTPATIENT)
Dept: LABOR AND DELIVERY | Facility: HOSPITAL | Age: 32
End: 2022-05-11

## 2022-05-11 DIAGNOSIS — O10.919 CHRONIC HYPERTENSION AFFECTING PREGNANCY: ICD-10-CM

## 2022-05-11 DIAGNOSIS — Z98.891 HISTORY OF 3 CESAREAN SECTIONS: ICD-10-CM

## 2022-05-11 PROBLEM — O16.9 ELEVATED BLOOD PRESSURE AFFECTING PREGNANCY, ANTEPARTUM: Status: RESOLVED | Noted: 2022-04-28 | Resolved: 2022-05-11

## 2022-05-11 PROBLEM — F32.A DEPRESSION: Status: ACTIVE | Noted: 2022-05-11

## 2022-05-11 PROBLEM — O34.593 ABNORMALITY OF UTERUS DURING PREGNANCY IN THIRD TRIMESTER: Status: ACTIVE | Noted: 2022-05-11

## 2022-05-11 PROBLEM — K21.9 GASTROESOPHAGEAL REFLUX DISEASE WITHOUT ESOPHAGITIS: Status: RESOLVED | Noted: 2022-04-18 | Resolved: 2022-05-11

## 2022-05-11 PROBLEM — R73.09 ELEVATED GLUCOSE TOLERANCE TEST: Status: RESOLVED | Noted: 2022-03-22 | Resolved: 2022-05-11

## 2022-05-11 PROBLEM — Z34.80 SUPERVISION OF OTHER NORMAL PREGNANCY, ANTEPARTUM: Status: RESOLVED | Noted: 2021-10-21 | Resolved: 2022-05-11

## 2022-05-11 LAB
ABO GROUP BLD: NORMAL
ALBUMIN SERPL-MCNC: 3.6 G/DL (ref 3.5–5.2)
ALBUMIN/GLOB SERPL: 1.1 G/DL
ALP SERPL-CCNC: 195 U/L (ref 39–117)
ALT SERPL W P-5'-P-CCNC: 18 U/L (ref 1–33)
ANION GAP SERPL CALCULATED.3IONS-SCNC: 14.2 MMOL/L (ref 5–15)
AST SERPL-CCNC: 22 U/L (ref 1–32)
BASE EXCESS BLDCOA CALC-SCNC: -2.6 MMOL/L
BASE EXCESS BLDCOV CALC-SCNC: -0.6 MMOL/L
BILIRUB SERPL-MCNC: 0.5 MG/DL (ref 0–1.2)
BLD GP AB SCN SERPL QL: NEGATIVE
BUN SERPL-MCNC: 5 MG/DL (ref 6–20)
BUN/CREAT SERPL: 8.6 (ref 7–25)
CALCIUM SPEC-SCNC: 9.2 MG/DL (ref 8.6–10.5)
CHLORIDE SERPL-SCNC: 102 MMOL/L (ref 98–107)
CO2 SERPL-SCNC: 22.8 MMOL/L (ref 22–29)
CREAT SERPL-MCNC: 0.58 MG/DL (ref 0.57–1)
CREAT UR-MCNC: 299.6 MG/DL
DEPRECATED RDW RBC AUTO: 43.4 FL (ref 37–54)
EGFRCR SERPLBLD CKD-EPI 2021: 122.7 ML/MIN/1.73
ERYTHROCYTE [DISTWIDTH] IN BLOOD BY AUTOMATED COUNT: 14.6 % (ref 12.3–15.4)
GLOBULIN UR ELPH-MCNC: 3.4 GM/DL
GLUCOSE SERPL-MCNC: 93 MG/DL (ref 65–99)
HCO3 BLDCOA-SCNC: 26.5 MMOL/L
HCO3 BLDCOV-SCNC: 27.2 MMOL/L
HCT VFR BLD AUTO: 31.6 % (ref 34–46.6)
HGB BLD-MCNC: 10.2 G/DL (ref 12–15.9)
MCH RBC QN AUTO: 26.7 PG (ref 26.6–33)
MCHC RBC AUTO-ENTMCNC: 32.3 G/DL (ref 31.5–35.7)
MCV RBC AUTO: 82.7 FL (ref 79–97)
PCO2 BLDCOA: 65.2 MMHG (ref 33–49)
PCO2 BLDCOV: 57.7 MM HG (ref 28–40)
PH BLDCOA: 7.23 PH UNITS (ref 7.21–7.31)
PH BLDCOV: 7.29 PH UNITS (ref 7.31–7.37)
PLATELET # BLD AUTO: 136 10*3/MM3 (ref 140–450)
PMV BLD AUTO: ABNORMAL FL
PO2 BLDCOA: <40.5 MMHG
PO2 BLDCOV: <40.5 MM HG (ref 21–31)
POTASSIUM SERPL-SCNC: 2.9 MMOL/L (ref 3.5–5.2)
PROT ?TM UR-MCNC: 56.5 MG/DL
PROT SERPL-MCNC: 7 G/DL (ref 6–8.5)
PROT/CREAT UR: 0.19 MG/G{CREAT}
RBC # BLD AUTO: 3.82 10*6/MM3 (ref 3.77–5.28)
RH BLD: POSITIVE
SODIUM SERPL-SCNC: 139 MMOL/L (ref 136–145)
T&S EXPIRATION DATE: NORMAL
WBC NRBC COR # BLD: 6.39 10*3/MM3 (ref 3.4–10.8)

## 2022-05-11 PROCEDURE — 0 MORPHINE PER 10 MG: Performed by: ANESTHESIOLOGY

## 2022-05-11 PROCEDURE — 85027 COMPLETE CBC AUTOMATED: CPT | Performed by: OBSTETRICS & GYNECOLOGY

## 2022-05-11 PROCEDURE — 0 MEPERIDINE PER 100 MG: Performed by: NURSE ANESTHETIST, CERTIFIED REGISTERED

## 2022-05-11 PROCEDURE — 82803 BLOOD GASES ANY COMBINATION: CPT | Performed by: OBSTETRICS & GYNECOLOGY

## 2022-05-11 PROCEDURE — C1765 ADHESION BARRIER: HCPCS | Performed by: OBSTETRICS & GYNECOLOGY

## 2022-05-11 PROCEDURE — 86901 BLOOD TYPING SEROLOGIC RH(D): CPT | Performed by: OBSTETRICS & GYNECOLOGY

## 2022-05-11 PROCEDURE — 63710000001 DIPHENHYDRAMINE PER 50 MG: Performed by: NURSE ANESTHETIST, CERTIFIED REGISTERED

## 2022-05-11 PROCEDURE — 59515 CESAREAN DELIVERY: CPT | Performed by: OBSTETRICS & GYNECOLOGY

## 2022-05-11 PROCEDURE — 86850 RBC ANTIBODY SCREEN: CPT | Performed by: OBSTETRICS & GYNECOLOGY

## 2022-05-11 PROCEDURE — 86900 BLOOD TYPING SEROLOGIC ABO: CPT | Performed by: OBSTETRICS & GYNECOLOGY

## 2022-05-11 PROCEDURE — 80053 COMPREHEN METABOLIC PANEL: CPT | Performed by: OBSTETRICS & GYNECOLOGY

## 2022-05-11 PROCEDURE — 25010000002 ONDANSETRON PER 1 MG: Performed by: OBSTETRICS & GYNECOLOGY

## 2022-05-11 PROCEDURE — 82570 ASSAY OF URINE CREATININE: CPT | Performed by: OBSTETRICS & GYNECOLOGY

## 2022-05-11 PROCEDURE — 25010000002 CEFAZOLIN IN DEXTROSE 2-4 GM/100ML-% SOLUTION: Performed by: OBSTETRICS & GYNECOLOGY

## 2022-05-11 PROCEDURE — 25010000002 KETOROLAC TROMETHAMINE PER 15 MG: Performed by: NURSE ANESTHETIST, CERTIFIED REGISTERED

## 2022-05-11 PROCEDURE — 84156 ASSAY OF PROTEIN URINE: CPT | Performed by: OBSTETRICS & GYNECOLOGY

## 2022-05-11 DEVICE — ABSORBABLE ADHESION BARRIER
Type: IMPLANTABLE DEVICE | Site: UTERUS | Status: FUNCTIONAL
Brand: GYNECARE INTERCEED

## 2022-05-11 RX ORDER — OXYCODONE HYDROCHLORIDE 5 MG/1
5 TABLET ORAL EVERY 4 HOURS PRN
Status: DISCONTINUED | OUTPATIENT
Start: 2022-05-12 | End: 2022-05-13 | Stop reason: HOSPADM

## 2022-05-11 RX ORDER — ONDANSETRON 2 MG/ML
4 INJECTION INTRAMUSCULAR; INTRAVENOUS EVERY 6 HOURS PRN
Status: DISCONTINUED | OUTPATIENT
Start: 2022-05-11 | End: 2022-05-11

## 2022-05-11 RX ORDER — LABETALOL 100 MG/1
100 TABLET, FILM COATED ORAL 2 TIMES DAILY
Status: DISCONTINUED | OUTPATIENT
Start: 2022-05-11 | End: 2022-05-13 | Stop reason: HOSPADM

## 2022-05-11 RX ORDER — SODIUM CHLORIDE 0.9 % (FLUSH) 0.9 %
3 SYRINGE (ML) INJECTION EVERY 12 HOURS SCHEDULED
Status: DISCONTINUED | OUTPATIENT
Start: 2022-05-11 | End: 2022-05-11

## 2022-05-11 RX ORDER — SODIUM CHLORIDE, SODIUM LACTATE, POTASSIUM CHLORIDE, CALCIUM CHLORIDE 600; 310; 30; 20 MG/100ML; MG/100ML; MG/100ML; MG/100ML
150 INJECTION, SOLUTION INTRAVENOUS CONTINUOUS
Status: DISCONTINUED | OUTPATIENT
Start: 2022-05-11 | End: 2022-05-11

## 2022-05-11 RX ORDER — ONDANSETRON 4 MG/1
4 TABLET, FILM COATED ORAL EVERY 6 HOURS PRN
Status: DISCONTINUED | OUTPATIENT
Start: 2022-05-11 | End: 2022-05-13 | Stop reason: HOSPADM

## 2022-05-11 RX ORDER — PHENYLEPHRINE HCL IN 0.9% NACL 1 MG/10 ML
SYRINGE (ML) INTRAVENOUS AS NEEDED
Status: DISCONTINUED | OUTPATIENT
Start: 2022-05-11 | End: 2022-05-11 | Stop reason: SURG

## 2022-05-11 RX ORDER — OXYTOCIN 10 [USP'U]/ML
INJECTION, SOLUTION INTRAMUSCULAR; INTRAVENOUS AS NEEDED
Status: DISCONTINUED | OUTPATIENT
Start: 2022-05-11 | End: 2022-05-11 | Stop reason: SURG

## 2022-05-11 RX ORDER — MORPHINE SULFATE 0.5 MG/ML
INJECTION, SOLUTION EPIDURAL; INTRATHECAL; INTRAVENOUS
Status: COMPLETED | OUTPATIENT
Start: 2022-05-11 | End: 2022-05-11

## 2022-05-11 RX ORDER — ONDANSETRON 2 MG/ML
4 INJECTION INTRAMUSCULAR; INTRAVENOUS EVERY 6 HOURS PRN
Status: DISCONTINUED | OUTPATIENT
Start: 2022-05-11 | End: 2022-05-13 | Stop reason: HOSPADM

## 2022-05-11 RX ORDER — BUPIVACAINE HYDROCHLORIDE 7.5 MG/ML
INJECTION, SOLUTION EPIDURAL; RETROBULBAR
Status: COMPLETED | OUTPATIENT
Start: 2022-05-11 | End: 2022-05-11

## 2022-05-11 RX ORDER — CEFAZOLIN SODIUM 2 G/100ML
2 INJECTION, SOLUTION INTRAVENOUS ONCE
Status: COMPLETED | OUTPATIENT
Start: 2022-05-11 | End: 2022-05-11

## 2022-05-11 RX ORDER — MISOPROSTOL 200 UG/1
800 TABLET ORAL AS NEEDED
Status: DISCONTINUED | OUTPATIENT
Start: 2022-05-11 | End: 2022-05-11

## 2022-05-11 RX ORDER — FAMOTIDINE 10 MG/ML
20 INJECTION, SOLUTION INTRAVENOUS ONCE AS NEEDED
Status: DISCONTINUED | OUTPATIENT
Start: 2022-05-11 | End: 2022-05-11

## 2022-05-11 RX ORDER — METOCLOPRAMIDE HYDROCHLORIDE 5 MG/ML
10 INJECTION INTRAMUSCULAR; INTRAVENOUS ONCE AS NEEDED
Status: DISCONTINUED | OUTPATIENT
Start: 2022-05-11 | End: 2022-05-11

## 2022-05-11 RX ORDER — LABETALOL HYDROCHLORIDE 5 MG/ML
20 INJECTION, SOLUTION INTRAVENOUS
Status: DISCONTINUED | OUTPATIENT
Start: 2022-05-11 | End: 2022-05-11 | Stop reason: HOSPADM

## 2022-05-11 RX ORDER — SODIUM CHLORIDE, SODIUM LACTATE, POTASSIUM CHLORIDE, CALCIUM CHLORIDE 600; 310; 30; 20 MG/100ML; MG/100ML; MG/100ML; MG/100ML
150 INJECTION, SOLUTION INTRAVENOUS CONTINUOUS
Status: DISCONTINUED | OUTPATIENT
Start: 2022-05-11 | End: 2022-05-13 | Stop reason: HOSPADM

## 2022-05-11 RX ORDER — DIPHENHYDRAMINE HYDROCHLORIDE 50 MG/ML
12.5 INJECTION INTRAMUSCULAR; INTRAVENOUS EVERY 6 HOURS PRN
Status: ACTIVE | OUTPATIENT
Start: 2022-05-11 | End: 2022-05-12

## 2022-05-11 RX ORDER — LIDOCAINE HYDROCHLORIDE 10 MG/ML
5 INJECTION, SOLUTION EPIDURAL; INFILTRATION; INTRACAUDAL; PERINEURAL AS NEEDED
Status: DISCONTINUED | OUTPATIENT
Start: 2022-05-11 | End: 2022-05-11

## 2022-05-11 RX ORDER — OXYCODONE HYDROCHLORIDE 5 MG/1
5 TABLET ORAL
Status: DISCONTINUED | OUTPATIENT
Start: 2022-05-11 | End: 2022-05-11

## 2022-05-11 RX ORDER — BUTORPHANOL TARTRATE 1 MG/ML
2 INJECTION, SOLUTION INTRAMUSCULAR; INTRAVENOUS EVERY 6 HOURS PRN
Status: ACTIVE | OUTPATIENT
Start: 2022-05-11 | End: 2022-05-12

## 2022-05-11 RX ORDER — SIMETHICONE 80 MG
80 TABLET,CHEWABLE ORAL 4 TIMES DAILY PRN
Status: DISCONTINUED | OUTPATIENT
Start: 2022-05-11 | End: 2022-05-13 | Stop reason: HOSPADM

## 2022-05-11 RX ORDER — SODIUM CHLORIDE 0.9 % (FLUSH) 0.9 %
10 SYRINGE (ML) INJECTION AS NEEDED
Status: DISCONTINUED | OUTPATIENT
Start: 2022-05-11 | End: 2022-05-11

## 2022-05-11 RX ORDER — IBUPROFEN 600 MG/1
600 TABLET ORAL EVERY 6 HOURS SCHEDULED
Status: DISCONTINUED | OUTPATIENT
Start: 2022-05-12 | End: 2022-05-12

## 2022-05-11 RX ORDER — OXYCODONE HYDROCHLORIDE 5 MG/1
10 TABLET ORAL EVERY 4 HOURS PRN
Status: DISCONTINUED | OUTPATIENT
Start: 2022-05-12 | End: 2022-05-13 | Stop reason: HOSPADM

## 2022-05-11 RX ORDER — ONDANSETRON 4 MG/1
4 TABLET, FILM COATED ORAL EVERY 6 HOURS PRN
Status: DISCONTINUED | OUTPATIENT
Start: 2022-05-11 | End: 2022-05-11

## 2022-05-11 RX ORDER — HYDROCORTISONE 25 MG/G
CREAM TOPICAL 3 TIMES DAILY PRN
Status: DISCONTINUED | OUTPATIENT
Start: 2022-05-11 | End: 2022-05-13 | Stop reason: HOSPADM

## 2022-05-11 RX ORDER — ACETAMINOPHEN 325 MG/1
650 TABLET ORAL ONCE AS NEEDED
Status: DISCONTINUED | OUTPATIENT
Start: 2022-05-11 | End: 2022-05-11

## 2022-05-11 RX ORDER — HYDROCODONE BITARTRATE AND ACETAMINOPHEN 5; 325 MG/1; MG/1
1 TABLET ORAL EVERY 6 HOURS PRN
Status: ACTIVE | OUTPATIENT
Start: 2022-05-11 | End: 2022-05-12

## 2022-05-11 RX ORDER — ACETAMINOPHEN 325 MG/1
650 TABLET ORAL EVERY 6 HOURS
Status: DISCONTINUED | OUTPATIENT
Start: 2022-05-11 | End: 2022-05-13 | Stop reason: HOSPADM

## 2022-05-11 RX ORDER — LABETALOL 100 MG/1
100 TABLET, FILM COATED ORAL ONCE
Status: COMPLETED | OUTPATIENT
Start: 2022-05-11 | End: 2022-05-11

## 2022-05-11 RX ORDER — MEPERIDINE HYDROCHLORIDE 25 MG/ML
INJECTION INTRAMUSCULAR; INTRAVENOUS; SUBCUTANEOUS AS NEEDED
Status: DISCONTINUED | OUTPATIENT
Start: 2022-05-11 | End: 2022-05-11 | Stop reason: SURG

## 2022-05-11 RX ORDER — OXYTOCIN/0.9 % SODIUM CHLORIDE 30/500 ML
125 PLASTIC BAG, INJECTION (ML) INTRAVENOUS ONCE
Status: COMPLETED | OUTPATIENT
Start: 2022-05-11 | End: 2022-05-11

## 2022-05-11 RX ORDER — DOCUSATE SODIUM 100 MG/1
100 CAPSULE, LIQUID FILLED ORAL DAILY
Status: DISCONTINUED | OUTPATIENT
Start: 2022-05-11 | End: 2022-05-13 | Stop reason: HOSPADM

## 2022-05-11 RX ORDER — DIPHENHYDRAMINE HCL 25 MG
25 CAPSULE ORAL EVERY 6 HOURS PRN
Status: DISPENSED | OUTPATIENT
Start: 2022-05-11 | End: 2022-05-12

## 2022-05-11 RX ORDER — MEPERIDINE HYDROCHLORIDE 25 MG/ML
12.5 INJECTION INTRAMUSCULAR; INTRAVENOUS; SUBCUTANEOUS
Status: DISCONTINUED | OUTPATIENT
Start: 2022-05-11 | End: 2022-05-11

## 2022-05-11 RX ORDER — MISOPROSTOL 100 UG/1
TABLET ORAL AS NEEDED
Status: DISCONTINUED | OUTPATIENT
Start: 2022-05-11 | End: 2022-05-13 | Stop reason: HOSPADM

## 2022-05-11 RX ORDER — NALOXONE HCL 0.4 MG/ML
0.4 VIAL (ML) INJECTION ONCE AS NEEDED
Status: ACTIVE | OUTPATIENT
Start: 2022-05-11 | End: 2022-05-12

## 2022-05-11 RX ORDER — FAMOTIDINE 20 MG/1
20 TABLET, FILM COATED ORAL ONCE AS NEEDED
Status: DISCONTINUED | OUTPATIENT
Start: 2022-05-11 | End: 2022-05-11

## 2022-05-11 RX ORDER — EPHEDRINE SULFATE 50 MG/ML
INJECTION, SOLUTION INTRAVENOUS AS NEEDED
Status: DISCONTINUED | OUTPATIENT
Start: 2022-05-11 | End: 2022-05-11 | Stop reason: SURG

## 2022-05-11 RX ORDER — HYDROMORPHONE HCL 110MG/55ML
0.25 PATIENT CONTROLLED ANALGESIA SYRINGE INTRAVENOUS
Status: DISCONTINUED | OUTPATIENT
Start: 2022-05-11 | End: 2022-05-11

## 2022-05-11 RX ORDER — TRISODIUM CITRATE DIHYDRATE AND CITRIC ACID MONOHYDRATE 500; 334 MG/5ML; MG/5ML
30 SOLUTION ORAL ONCE
Status: DISCONTINUED | OUTPATIENT
Start: 2022-05-11 | End: 2022-05-11

## 2022-05-11 RX ORDER — KETOROLAC TROMETHAMINE 30 MG/ML
30 INJECTION, SOLUTION INTRAMUSCULAR; INTRAVENOUS EVERY 6 HOURS
Status: COMPLETED | OUTPATIENT
Start: 2022-05-11 | End: 2022-05-12

## 2022-05-11 RX ORDER — KETOROLAC TROMETHAMINE 30 MG/ML
INJECTION, SOLUTION INTRAMUSCULAR; INTRAVENOUS AS NEEDED
Status: DISCONTINUED | OUTPATIENT
Start: 2022-05-11 | End: 2022-05-11 | Stop reason: SURG

## 2022-05-11 RX ORDER — HYDROMORPHONE HCL 110MG/55ML
0.5 PATIENT CONTROLLED ANALGESIA SYRINGE INTRAVENOUS
Status: DISCONTINUED | OUTPATIENT
Start: 2022-05-11 | End: 2022-05-11

## 2022-05-11 RX ADMIN — Medication 50 MCG: at 09:38

## 2022-05-11 RX ADMIN — ONDANSETRON 4 MG: 2 INJECTION INTRAMUSCULAR; INTRAVENOUS at 12:24

## 2022-05-11 RX ADMIN — MORPHINE SULFATE 0.2 MG: 0.5 INJECTION, SOLUTION EPIDURAL; INTRATHECAL; INTRAVENOUS at 09:26

## 2022-05-11 RX ADMIN — SODIUM CHLORIDE, POTASSIUM CHLORIDE, SODIUM LACTATE AND CALCIUM CHLORIDE 150 ML/HR: 600; 310; 30; 20 INJECTION, SOLUTION INTRAVENOUS at 15:01

## 2022-05-11 RX ADMIN — CEFAZOLIN SODIUM 2 G: 2 INJECTION, SOLUTION INTRAVENOUS at 08:59

## 2022-05-11 RX ADMIN — KETOROLAC TROMETHAMINE 30 MG: 30 INJECTION, SOLUTION INTRAMUSCULAR; INTRAVENOUS at 20:30

## 2022-05-11 RX ADMIN — ACETAMINOPHEN 650 MG: 325 TABLET ORAL at 20:30

## 2022-05-11 RX ADMIN — KETOROLAC TROMETHAMINE 30 MG: 30 INJECTION, SOLUTION INTRAMUSCULAR; INTRAVENOUS at 14:27

## 2022-05-11 RX ADMIN — SERTRALINE HYDROCHLORIDE 50 MG: 50 TABLET ORAL at 14:27

## 2022-05-11 RX ADMIN — EPHEDRINE SULFATE 10 MG: 50 INJECTION INTRAVENOUS at 10:03

## 2022-05-11 RX ADMIN — SODIUM CHLORIDE, POTASSIUM CHLORIDE, SODIUM LACTATE AND CALCIUM CHLORIDE: 600; 310; 30; 20 INJECTION, SOLUTION INTRAVENOUS at 09:17

## 2022-05-11 RX ADMIN — Medication 125 ML/HR: at 10:53

## 2022-05-11 RX ADMIN — Medication 100 MCG: at 09:19

## 2022-05-11 RX ADMIN — SODIUM CHLORIDE, POTASSIUM CHLORIDE, SODIUM LACTATE AND CALCIUM CHLORIDE 999 ML/HR: 600; 310; 30; 20 INJECTION, SOLUTION INTRAVENOUS at 13:49

## 2022-05-11 RX ADMIN — EPHEDRINE SULFATE 5 MG: 50 INJECTION INTRAVENOUS at 09:19

## 2022-05-11 RX ADMIN — Medication 100 MCG: at 09:21

## 2022-05-11 RX ADMIN — KETOROLAC TROMETHAMINE 30 MG: 30 INJECTION, SOLUTION INTRAMUSCULAR; INTRAVENOUS at 10:25

## 2022-05-11 RX ADMIN — DIPHENHYDRAMINE HYDROCHLORIDE 25 MG: 25 CAPSULE ORAL at 18:10

## 2022-05-11 RX ADMIN — MEPERIDINE HYDROCHLORIDE 12.5 MG: 25 INJECTION INTRAMUSCULAR; INTRAVENOUS; SUBCUTANEOUS at 10:23

## 2022-05-11 RX ADMIN — MEPERIDINE HYDROCHLORIDE 12.5 MG: 25 INJECTION INTRAMUSCULAR; INTRAVENOUS; SUBCUTANEOUS at 11:15

## 2022-05-11 RX ADMIN — EPHEDRINE SULFATE 5 MG: 50 INJECTION INTRAVENOUS at 09:18

## 2022-05-11 RX ADMIN — MEPERIDINE HYDROCHLORIDE 12.5 MG: 25 INJECTION INTRAMUSCULAR; INTRAVENOUS; SUBCUTANEOUS at 10:09

## 2022-05-11 RX ADMIN — EPHEDRINE SULFATE 5 MG: 50 INJECTION INTRAVENOUS at 09:37

## 2022-05-11 RX ADMIN — OXYTOCIN 20 UNITS: 10 INJECTION, SOLUTION INTRAMUSCULAR; INTRAVENOUS at 09:46

## 2022-05-11 RX ADMIN — LABETALOL HYDROCHLORIDE 100 MG: 100 TABLET, FILM COATED ORAL at 08:58

## 2022-05-11 RX ADMIN — DOCUSATE SODIUM 100 MG: 100 CAPSULE, LIQUID FILLED ORAL at 14:27

## 2022-05-11 RX ADMIN — Medication 50 MCG: at 09:39

## 2022-05-11 RX ADMIN — Medication 100 MCG: at 10:00

## 2022-05-11 RX ADMIN — BUPIVACAINE HYDROCHLORIDE 1.2 ML: 7.5 INJECTION, SOLUTION EPIDURAL; RETROBULBAR at 09:26

## 2022-05-11 RX ADMIN — LABETALOL HYDROCHLORIDE 100 MG: 100 TABLET, FILM COATED ORAL at 20:29

## 2022-05-11 RX ADMIN — ACETAMINOPHEN 650 MG: 325 TABLET ORAL at 14:27

## 2022-05-11 RX ADMIN — Medication 100 MCG: at 09:57

## 2022-05-11 RX ADMIN — SODIUM CHLORIDE, POTASSIUM CHLORIDE, SODIUM LACTATE AND CALCIUM CHLORIDE: 600; 310; 30; 20 INJECTION, SOLUTION INTRAVENOUS at 09:02

## 2022-05-11 RX ADMIN — Medication 75 MCG: at 09:31

## 2022-05-11 NOTE — OP NOTE
OB Operative Note        Date of Service:  22     Pre-Operative Dx:   IUP at Gestational Age: 38w0d  weeks    indication: Scheduled repeat     Postoperative dx:   CHUCKIE  Extremely thin lower uterine segment    Procedure: Repeat LTCS    Surgeon/Assistant: Dr. Lia Guzman, DO, assist: Laurie    Anesthesia:  Spinal    Anesthesiologist:  Anesthesiologist: Philip Snow MD  CRNA: Tamara Conte CRNA    EBL: 500 Mls    Findings: Normal tubes, Normal ovaries, Normal placenta, centrally inserting cord, THIN CEE Paperthin, ruptured with touch of scalpel     I have dw pt and FOB due to thinness and unable to place two layer closer strongly consider this be their last child due to potential risks, if she does conceive, I'd rec at least 2years to heal and del at 37+0 sooner prn mat/fetal indications    Infant: Gender:  male  infant    Weight:   3170 g (6 lb 15.8 oz)     APGARS:  7  @ 1 minute / 8  @ 5 minutes    Specimens:  Cord blood, cord gases     Procedure Details:  The patient was brought to the operating room and spinal anesthesia was deemed to be adequate.  She was prepped and draped in a normal sterile fashion and placed in supine position with a leftward tilt.  A Pfannenstiel skin incision was made approximately 2 fingerbreadths above the symphysis pubis and carried down to the underlying layer of fascia.  Her old skin incision was excised and discarded.  The fascia was nicked in the midline and extended laterally with Covington scissors.  The superior and inferior aspects of the fascial incision were grasped with Kocher clamps and the underlying rectus muscle was dissected off bluntly.  The midline was identified and opened in a sharp fashion with no noted damage to underlying bowel, bladder, blood vessels, or organs.  The vesicouterine peritoneum was incised and the bladder flap was created.  The lower uterine segment was incised.  With touch of scalpel, amnion ruptured.  It was extended laterally  in a manual fashion.  Fluid was noted to be clear.  The fetal vertex was brought up atraumatically through the uterine incision.  The mouth and nares were bulb suctioned.  The left anterior and right posterior shoulders were delivered easily.  The remainder of the body delivered.  The infant was vigorous.  The cord was clamped and cut after a delay of 60 seconds and the infant was handed off to [facility] baby care.  A segment of cord was handed off to the technician for collection of cord blood and cord gases.  The placenta delivered with the assistance of fundal massage and was discarded.  The uterus was exteriorized and cleared of all clots and debris.  The uterine incision was repaired with 0 Vicryl in a running fashion.  A second imbricating stitch was unable to be placed secondary to the thinness of the lower uterine segment and nearness to the upper aspect of the bladder.  The upper aspect of the bladder flap was below the hysterotomy incision.  Excellent hemostasis was assured.       The uterus was placed back into the pelvic cavity.  Copious irrigation was performed.  The gutters were cleared of all clot and debris.  The uterine incision was reinspected off tension and noted to be hemostatic.  Interceed was placed over the uterine incision and anterior uterus.  The parietal peritoneum was closed.  The rectus muscles were reapproximated in the midline.  The rectus muscles and fascia were noted to be hemostatic.  The fascia was closed with 0 Vicryl in a running fashion starting at the bilateral angles and to the midline.  The subcutaneous tissue was copiously irrigated and made hemostatic.  It was reapproximated using monocryl and the skin was closed with staples.  Urine was clear at the end of the procedure.     The patient tolerated the procedure well.  Instrument, lap, and needle counts were correct.  The patient received antibiotics prior to the procedure.  She was taken to the recovery room in stable  condition.        Complications: None    Condition: Good    Disposition:  PACU          Electronically signed by:  Lia Guzman DO, 05/11/22, 10:54 AM EDT.

## 2022-05-11 NOTE — ANESTHESIA PREPROCEDURE EVALUATION
Anesthesia Evaluation     Patient summary reviewed and Nursing notes reviewed   no history of anesthetic complications:  NPO Solid Status: > 8 hours  NPO Liquid Status: > 2 hours           Airway   Mallampati: II  TM distance: >3 FB  Neck ROM: full  No difficulty expected  Dental      Pulmonary - negative pulmonary ROS and normal exam    breath sounds clear to auscultation  Cardiovascular - normal exam  Exercise tolerance: good (4-7 METS)    Rhythm: regular  Rate: normal    (+) hypertension,       Neuro/Psych- negative ROS  GI/Hepatic/Renal/Endo    (+)  GERD,      Musculoskeletal (-) negative ROS    Abdominal    Substance History - negative use     OB/GYN    (+) Pregnant,         Other - negative ROS                       Anesthesia Plan    ASA 2     spinal       Anesthetic plan, all risks, benefits, and alternatives have been provided, discussed and informed consent has been obtained with: patient and spouse/significant other.        CODE STATUS:    Code Status (Patient has no pulse and is not breathing): CPR (Attempt to Resuscitate)  Medical Interventions (Patient has pulse or is breathing): Full

## 2022-05-11 NOTE — DISCHARGE SUMMARY
OB Discharge Summary        Admit Date:  2022  Date of Delivery: 2022   Discharge Date: 22      Reason for Admission: Scheduled repeat     Final Diagnosis:  38+0  Chronic hypertension  Prior  x3  Gestational thrombocytopenia  Extremely thin lower uterine segment  To do at FU: Follow-up depression-on Zoloft, follow-up hypertension-on labetalol increased to 200mg BID, routine postpartum, COVID-vaccine    Antepartum:  Prenatal care is complicated by:  CHTN, Depression    Intrapartum/Delivery:  OB Surgeon:  Dr. Lia Guzman, DO  Anesthesia: Spinal  Delivery Type:     Feeding method: Breast     Infant: male  infant; Tommy    Weight: 3170 g (6 lb 15.8 oz)      APGARS: 7  @ 1 minute / 8  @ 5 minutes    Hospital Course/Significant Findings:  Patient arrived for scheduled .  I have dw pt and FOB due to thinness and unable to place two layer closer strongly consider this be last child, if she does conceive, I'd rec at least 2years to heal and del at 37+0 sooner prn mat/fetal indications.  Uncomplicated CS and PP.  BP mildly elev on POD2, labetalol increased to 200mg BID.  Radhika #5 Rx.    Results from last 7 days   Lab Units 22  0711 22  0710   WBC 10*3/mm3 7.55 6.39   HEMOGLOBIN g/dL 9.0* 10.2*   HEMATOCRIT % 28.7* 31.6*   PLATELETS 10*3/mm3 115* 136*       Results from last 7 days   Lab Units 22  0710   GLUCOSE mg/dL 93   CREATININE mg/dL 0.58   SODIUM mmol/L 139   POTASSIUM mmol/L 2.9*   CHLORIDE mmol/L 102   AST (SGOT) U/L 22   ALT (SGPT) U/L 18       Lab Results   Component Value Date    CREATININEUR 299.6 2022    PROTEINUR 56.5 2022    UTPCR 0.19 2022        Discharge:         Discharge Medications      New Medications      Instructions Start Date   acetaminophen 325 MG tablet  Commonly known as: Tylenol   650 mg, Oral, Every 6 Hours PRN      docusate sodium 100 MG capsule  Commonly known as: Colace   100 mg, Oral, 2 Times Daily PRN       ibuprofen 800 MG tablet  Commonly known as: ADVIL,MOTRIN   800 mg, Oral, Every 8 Hours PRN      oxyCODONE 5 MG immediate release tablet  Commonly known as: ROXICODONE   Take 1-2 tabs every 4hrs as needed for pain UNCONTROLLED w motrin (ibuprofen) and/or tylenol as directed         Changes to Medications      Instructions Start Date   labetalol 200 MG tablet  Commonly known as: NORMODYNE  What changed:   · medication strength  · how much to take   200 mg, Oral, 2 Times Daily         Continue These Medications      Instructions Start Date   ferrous sulfate 325 (65 FE) MG tablet   325 mg, Oral, Every Other Day      freestyle lancets   USE AS DIRECTED TO TEST BLOOD SUGAR FOUR TIMES A DAY      prenatal (CLASSIC) vitamin  tablet  Generic drug: prenatal vitamin   1 tablet, Oral, Daily      sertraline 50 MG tablet  Commonly known as: ZOLOFT   1 tablet, Oral, Daily               Disposition: Home  Diet: Regular    Pelvic Rest: 6 weeks    Condition at discharge: Good    Follow up with: Lia Guzman DO or provider of her choice    Follow up in: 5 weeks, 1 week BP check    Complications: None      Signature: Electronically signed by Lia Guzman DO, 05/13/22, 8:25 AM EDT.        [facility]  [address]  [place] [address]  Dept: [phone]  Loc: [phone]

## 2022-05-11 NOTE — DISCHARGE INSTRUCTIONS
DR. KELLY'S POSTOP  DISCHARGE PRECAUTIONS and Answers to FAQs     NO SEX, tampons, or douching for SIX weeks.     NO DRIVING for TWO weeks. or while taking narcotic pain medications.     NO TUB BATH or POOL for FOUR weeks, shower only.       NO LIFTING more than 20 pounds for 2 week(s).     STAPLES (if present):  follow up at the office in the next 3-7 days to have them removed if they were not removed before discharge.  If your staples were removed already and steri-strips placed (or you just had steri-strips) REMOVE YOUR STERI STRIPS in TEN DAYS.      INCISION CARE:   WASH DAILY in the shower with soap and water (any type of soap is fine, it does not need to be antibacterial soap).  Look (or have a family member/friend look) at your incision EVERY DAY when you get home.  Keeping the incision DRY is extremely important.  Continue to keep a clean, dry wash cloth (to help wick away moisture) on your incision for 10 days.  Change out the wash cloth frequently (approximately every 2-8 hrs as needed to prevent it from getting moist).  REDNESS, PUS, increase in PAIN, FEVER or CHILLS are all reasons to be seen in our office immediately.  Go to the ER, if it is after hours or a weekend.         VAGINAL BLEEDING:  may continue on and off over the next several weeks after delivery and may increase slightly once you go home.  You should not be bleeding more than 1 large pad soaked every hour or two.  Clots (even the size of a lemon or larger) may be normal as long as the bleeding is not heavy and the clots do not continue.       FEVER or CHILLS or NOT FEELING WELL: call our office.  If the office is closed, you need to be seen in acute care or ER.       CHEST PAIN or SHORTNESS OF BREATH/AIR: you need to GO TO THE NEAREST ER or CALL 911.      SWELLING:  can increase over the next 7-10 days and then should slowly improve.  Your legs/ankles should be fairly similar in size.  A red, painful, hot, swollen leg (usually  just one side) can be a sign of a blood clot and should be evaluated immediately.  Call our office.  If it is after hours or a weekend, you must be seen IMMEDIATELY IN THE ER.      ELEVATED BLOOD PRESSURE:  you need to contact us if you are having  persistent elevated BP systolic (top number) more than 155 or diastolic (bottom number) more than 95, or a headache (not relieved with rest, hydration or over the counter pain reliever), an increase in your swelling (usually hands and face), changes in your vision (typically flashing white or black spots) or severe persistent pain in the location of the upper right side of your belly (under your right breast).  Call our office or go to ER if after hours or a weekend.     LACTATION QUESTIONS or CONCERNS?  Call [facility] Lactation support [phone].     WORK and SCHOOL TIME OFF: depends on your specific delivery type, surrounding circumstances, and your work insurance/school rules.  If you have questions, please call Nena or Mi at [phone] (ext. [phone]).  Or email Nena at [email or web address].  They will assist in required paperwork for you and/or family members.      For further QUESTIONS or CONCERNS, please call [facility] NICO Gauthier at [phone].

## 2022-05-11 NOTE — ANESTHESIA PROCEDURE NOTES
Spinal Block      Patient reassessed immediately prior to procedure    Patient location during procedure: OB  Indication:at surgeon's request and post-op pain management  Preanesthetic Checklist  Completed: patient identified, IV checked, risks and benefits discussed, surgical consent, monitors and equipment checked, pre-op evaluation and timeout performed  Spinal Block Prep:  Patient Position:sitting  Sterile Tech:cap, gloves, mask and sterile barriers  Prep:Chloraprep  Patient Monitoring:blood pressure monitoring, continuous pulse oximetry and EKG  Spinal Block Procedure  Approach:midline  Guidance:landmark technique and palpation technique  Location:L3-L4  Needle Type:Leandro  Needle Gauge:25 G  Placement of Spinal needle event:cerebrospinal fluid aspirated  Paresthesia: no  Fluid Appearance:clear  Medications: morphine PF (DURAMORPH) injection, 0.2 mg  bupivacaine PF (MARCAINE) injection 0.75%, 1.2 mL   Post Assessment  Patient Tolerance:patient tolerated the procedure well with no apparent complications  Complications no  Additional Notes  Skin infiltration with Lidocaine 1%. Introducer inserted, followed by spinal needle. + CSF return. Intrathecal marcaine and PF duramorph/fentanyl injected (see eMAR). Spinal needle/introducer removed. Site clean/dry/intact.

## 2022-05-11 NOTE — PLAN OF CARE
Problem: Adult Inpatient Plan of Care  Goal: Plan of Care Review  Outcome: Ongoing, Progressing  Flowsheets (Taken 2022 1520)  Progress: improving  Plan of Care Reviewed With: patient  Outcome Evaluation:   s/p repeat    pain well controlled with current medication regimen   tolerating po fluids with no vomiting   iv fluid bolus given for low urine output in recovery, tolerating bolus well   vital signs remain stable   will cont current treatment plan  Goal: Patient-Specific Goal (Individualized)  Outcome: Ongoing, Progressing  Goal: Absence of Hospital-Acquired Illness or Injury  Outcome: Ongoing, Progressing  Intervention: Identify and Manage Fall Risk  Recent Flowsheet Documentation  Taken 2022 1400 by Luz Gibson RN  Safety Promotion/Fall Prevention: safety round/check completed  Intervention: Prevent Skin Injury  Recent Flowsheet Documentation  Taken 2022 1400 by Luz Gibson RN  Body Position: sitting up in bed  Intervention: Prevent and Manage VTE (Venous Thromboembolism) Risk  Recent Flowsheet Documentation  Taken 2022 1400 by Luz Gibson RN  Activity Management: bedrest  Intervention: Prevent Infection  Recent Flowsheet Documentation  Taken 2022 1400 by Luz Gibson RN  Infection Prevention:   hand hygiene promoted   single patient room provided  Goal: Optimal Comfort and Wellbeing  Outcome: Ongoing, Progressing  Goal: Readiness for Transition of Care  Outcome: Ongoing, Progressing     Problem: Adjustment to Role Transition (Postpartum  Delivery)  Goal: Successful Maternal Role Transition  Outcome: Ongoing, Progressing     Problem: Bleeding (Postpartum  Delivery)  Goal: Hemostasis  Outcome: Ongoing, Progressing     Problem: Infection (Postpartum  Delivery)  Goal: Absence of Infection Signs and Symptoms  Outcome: Ongoing, Progressing     Problem: Pain (Postpartum  Delivery)  Goal: Acceptable Pain Control  Outcome: Ongoing,  Progressing     Problem: Postoperative Nausea and Vomiting (Postpartum  Delivery)  Goal: Nausea and Vomiting Relief  Outcome: Ongoing, Progressing     Problem: Postoperative Urinary Retention (Postpartum  Delivery)  Goal: Effective Urinary Elimination  Outcome: Ongoing, Progressing   Goal Outcome Evaluation:  Plan of Care Reviewed With: patient        Progress: improving  Outcome Evaluation: s/p repeat ; pain well controlled with current medication regimen; tolerating po fluids with no vomiting; iv fluid bolus given for low urine output in recovery, tolerating bolus well; vital signs remain stable; will cont current treatment plan

## 2022-05-11 NOTE — LACTATION NOTE
FELIZ in to assist with this breastfeeding session in the recovery room. Baby was transitioned in the NBN then was able to go to the breast. He struggled with latching to the right side but easily latches to the left. Good swallows seen. Patient shows good skills and confidence with breastfeeding.

## 2022-05-12 LAB
BASOPHILS # BLD AUTO: 0.01 10*3/MM3 (ref 0–0.2)
BASOPHILS NFR BLD AUTO: 0.1 % (ref 0–1.5)
CLUMPED PLATELETS: PRESENT
DEPRECATED RDW RBC AUTO: 44.1 FL (ref 37–54)
EOSINOPHIL # BLD AUTO: 0.03 10*3/MM3 (ref 0–0.4)
EOSINOPHIL NFR BLD AUTO: 0.4 % (ref 0.3–6.2)
ERYTHROCYTE [DISTWIDTH] IN BLOOD BY AUTOMATED COUNT: 14.6 % (ref 12.3–15.4)
HCT VFR BLD AUTO: 28.7 % (ref 34–46.6)
HGB BLD-MCNC: 9 G/DL (ref 12–15.9)
IMM GRANULOCYTES # BLD AUTO: 0.02 10*3/MM3 (ref 0–0.05)
IMM GRANULOCYTES NFR BLD AUTO: 0.3 % (ref 0–0.5)
LYMPHOCYTES # BLD AUTO: 1.29 10*3/MM3 (ref 0.7–3.1)
LYMPHOCYTES NFR BLD AUTO: 17.1 % (ref 19.6–45.3)
MCH RBC QN AUTO: 26.5 PG (ref 26.6–33)
MCHC RBC AUTO-ENTMCNC: 31.4 G/DL (ref 31.5–35.7)
MCV RBC AUTO: 84.7 FL (ref 79–97)
MONOCYTES # BLD AUTO: 0.61 10*3/MM3 (ref 0.1–0.9)
MONOCYTES NFR BLD AUTO: 8.1 % (ref 5–12)
NEUTROPHILS NFR BLD AUTO: 5.59 10*3/MM3 (ref 1.7–7)
NEUTROPHILS NFR BLD AUTO: 74 % (ref 42.7–76)
NRBC BLD AUTO-RTO: 0 /100 WBC (ref 0–0.2)
PLATELET # BLD AUTO: 115 10*3/MM3 (ref 140–450)
PMV BLD AUTO: 13.3 FL (ref 6–12)
POLYCHROMASIA BLD QL SMEAR: NORMAL
RBC # BLD AUTO: 3.39 10*6/MM3 (ref 3.77–5.28)
SMALL PLATELETS BLD QL SMEAR: NORMAL
WBC MORPH BLD: NORMAL
WBC NRBC COR # BLD: 7.55 10*3/MM3 (ref 3.4–10.8)

## 2022-05-12 PROCEDURE — 25010000002 KETOROLAC TROMETHAMINE PER 15 MG: Performed by: NURSE ANESTHETIST, CERTIFIED REGISTERED

## 2022-05-12 PROCEDURE — 0503F POSTPARTUM CARE VISIT: CPT | Performed by: OBSTETRICS & GYNECOLOGY

## 2022-05-12 PROCEDURE — 85007 BL SMEAR W/DIFF WBC COUNT: CPT | Performed by: OBSTETRICS & GYNECOLOGY

## 2022-05-12 PROCEDURE — 85025 COMPLETE CBC W/AUTO DIFF WBC: CPT | Performed by: OBSTETRICS & GYNECOLOGY

## 2022-05-12 RX ORDER — IBUPROFEN 600 MG/1
600 TABLET ORAL EVERY 6 HOURS SCHEDULED
Status: DISCONTINUED | OUTPATIENT
Start: 2022-05-12 | End: 2022-05-13 | Stop reason: HOSPADM

## 2022-05-12 RX ADMIN — ACETAMINOPHEN 650 MG: 325 TABLET ORAL at 03:01

## 2022-05-12 RX ADMIN — OXYCODONE HYDROCHLORIDE 5 MG: 5 TABLET ORAL at 21:23

## 2022-05-12 RX ADMIN — KETOROLAC TROMETHAMINE 30 MG: 30 INJECTION, SOLUTION INTRAMUSCULAR; INTRAVENOUS at 09:16

## 2022-05-12 RX ADMIN — ACETAMINOPHEN 650 MG: 325 TABLET ORAL at 09:16

## 2022-05-12 RX ADMIN — DOCUSATE SODIUM 100 MG: 100 CAPSULE, LIQUID FILLED ORAL at 09:16

## 2022-05-12 RX ADMIN — ACETAMINOPHEN 650 MG: 325 TABLET ORAL at 15:41

## 2022-05-12 RX ADMIN — ACETAMINOPHEN 650 MG: 325 TABLET ORAL at 21:20

## 2022-05-12 RX ADMIN — KETOROLAC TROMETHAMINE 30 MG: 30 INJECTION, SOLUTION INTRAMUSCULAR; INTRAVENOUS at 03:01

## 2022-05-12 RX ADMIN — LABETALOL HYDROCHLORIDE 100 MG: 100 TABLET, FILM COATED ORAL at 09:16

## 2022-05-12 RX ADMIN — LABETALOL HYDROCHLORIDE 100 MG: 100 TABLET, FILM COATED ORAL at 21:20

## 2022-05-12 RX ADMIN — IBUPROFEN 600 MG: 600 TABLET ORAL at 15:41

## 2022-05-12 RX ADMIN — SERTRALINE HYDROCHLORIDE 50 MG: 50 TABLET ORAL at 09:17

## 2022-05-12 NOTE — PROGRESS NOTES
PostPartum/PostOp PROGRESS NOTE        Subjective:  • Patient has no complaints  • Pain controlled  • Tolerating a regular diet  • Not passing flatus  • Ambulating  • Urinating spontaneously  • Lochia decreasing, no bleeding concerns  • Denies HA, vision change, or RUQ/epigastric pain  • No lightheadedness or dizziness    Objective:   Vitals: reviewed  General- NAD, alert and oriented, appropriate  Psych- Normal mood, good memory  CV/Resp- CV- Regular rhythm, no murnurs and Resp- CTA to bases, no wheezes  Abdomen- Fundus firm, non tender, Soft, non distended, non tender, normal active bowel sounds, Bandage intact and Fundus at U-2  Ext/DTRs- No edema, bilaterally equal, no signs of DVT, SCDs off    Results from last 7 days   Lab Units 05/11/22  0710   WBC 10*3/mm3 6.39   HEMOGLOBIN g/dL 10.2*   HEMATOCRIT % 31.6*   PLATELETS 10*3/mm3 136*       Results from last 7 days   Lab Units 05/11/22  0710   GLUCOSE mg/dL 93   CREATININE mg/dL 0.58   SODIUM mmol/L 139   POTASSIUM mmol/L 2.9*   CHLORIDE mmol/L 102   AST (SGOT) U/L 22   ALT (SGPT) U/L 18       Assessment:    Post-partum/postop Day:  1  CHTN, mild range BP  Plan:   • Routine postpartum/postop care  • Remove IV  • Remove bandage  • Shower  • PO pain meds  • Importance of wound care/keep clean and dry  • Discussed intraop findings and thin CEE              Electronically signed by Lia Guzman DO, 05/12/22, 7:34 AM EDT.

## 2022-05-12 NOTE — PLAN OF CARE
Problem: Adult Inpatient Plan of Care  Goal: Plan of Care Review  Outcome: Ongoing, Progressing  Goal: Patient-Specific Goal (Individualized)  Outcome: Ongoing, Progressing  Goal: Absence of Hospital-Acquired Illness or Injury  Outcome: Ongoing, Progressing  Intervention: Identify and Manage Fall Risk  Recent Flowsheet Documentation  Taken 2022 030 by Armida He RN  Safety Promotion/Fall Prevention: safety round/check completed  Taken 2022 003 by Armida He RN  Safety Promotion/Fall Prevention: safety round/check completed  Taken 2022 by Armida He RN  Safety Promotion/Fall Prevention: safety round/check completed  Intervention: Prevent Skin Injury  Recent Flowsheet Documentation  Taken 2022 by Armida He RN  Body Position: position changed independently  Intervention: Prevent and Manage VTE (Venous Thromboembolism) Risk  Recent Flowsheet Documentation  Taken 2022 0030 by Armida He RN  Activity Management: ambulated to bathroom  Taken 2022 by Armida He RN  VTE Prevention/Management:   bilateral   sequential compression devices on  Goal: Optimal Comfort and Wellbeing  Outcome: Ongoing, Progressing  Intervention: Monitor Pain and Promote Comfort  Recent Flowsheet Documentation  Taken 2022 030 by Armida He RN  Pain Management Interventions: see MAR  Taken 2022 by Armida He RN  Pain Management Interventions:   pain management plan reviewed with patient/caregiver   see MAR  Goal: Readiness for Transition of Care  Outcome: Ongoing, Progressing     Problem: Adjustment to Role Transition (Postpartum  Delivery)  Goal: Successful Maternal Role Transition  Outcome: Ongoing, Progressing     Problem: Bleeding (Postpartum  Delivery)  Goal: Hemostasis  Outcome: Ongoing, Progressing     Problem: Infection (Postpartum  Delivery)  Goal: Absence of Infection Signs and Symptoms  Outcome: Ongoing,  Progressing     Problem: Pain (Postpartum  Delivery)  Goal: Acceptable Pain Control  Outcome: Ongoing, Progressing  Intervention: Prevent or Manage Pain  Recent Flowsheet Documentation  Taken 2022 0301 by Armida He RN  Pain Management Interventions: see MAR  Taken 2022 2030 by Armida He, RN  Pain Management Interventions:   pain management plan reviewed with patient/caregiver   see MAR     Problem: Postoperative Nausea and Vomiting (Postpartum  Delivery)  Goal: Nausea and Vomiting Relief  Outcome: Ongoing, Progressing     Problem: Postoperative Urinary Retention (Postpartum  Delivery)  Goal: Effective Urinary Elimination  Outcome: Ongoing, Progressing   Goal Outcome Evaluation:

## 2022-05-12 NOTE — PLAN OF CARE
Problem: Adult Inpatient Plan of Care  Goal: Plan of Care Review  Outcome: Ongoing, Progressing  Flowsheets  Taken 2022 1804 by Malgorzata Blanco RN  Plan of Care Reviewed With: patient  Taken 2022 1520 by Luz Gibson RN  Progress: improving  Goal: Patient-Specific Goal (Individualized)  Outcome: Ongoing, Progressing  Goal: Absence of Hospital-Acquired Illness or Injury  Outcome: Ongoing, Progressing  Intervention: Identify and Manage Fall Risk  Recent Flowsheet Documentation  Taken 2022 1600 by Malgorzata Blanco RN  Safety Promotion/Fall Prevention: safety round/check completed  Taken 2022 1500 by Malgorzata Blanco RN  Safety Promotion/Fall Prevention: safety round/check completed  Taken 2022 1400 by Malgorzata Blanco RN  Safety Promotion/Fall Prevention: safety round/check completed  Taken 2022 1300 by Malgorzata Blanco RN  Safety Promotion/Fall Prevention: safety round/check completed  Taken 2022 1200 by Malgorzata Blanco RN  Safety Promotion/Fall Prevention: safety round/check completed  Taken 2022 1100 by Malgorzata Blanco RN  Safety Promotion/Fall Prevention: safety round/check completed  Taken 2022 1000 by Malgorzata Blanco RN  Safety Promotion/Fall Prevention: safety round/check completed  Taken 2022 0900 by Malgorzata Blanco RN  Safety Promotion/Fall Prevention: safety round/check completed  Taken 2022 0800 by Malgorzata Blanco RN  Safety Promotion/Fall Prevention: safety round/check completed  Goal: Optimal Comfort and Wellbeing  Outcome: Ongoing, Progressing  Goal: Readiness for Transition of Care  Outcome: Ongoing, Progressing     Problem: Adjustment to Role Transition (Postpartum  Delivery)  Goal: Successful Maternal Role Transition  Outcome: Ongoing, Progressing     Problem: Bleeding (Postpartum  Delivery)  Goal: Hemostasis  Outcome: Ongoing, Progressing     Problem: Infection (Postpartum  Delivery)  Goal: Absence of Infection Signs and  Symptoms  Outcome: Ongoing, Progressing     Problem: Pain (Postpartum  Delivery)  Goal: Acceptable Pain Control  Outcome: Ongoing, Progressing     Problem: Postoperative Nausea and Vomiting (Postpartum  Delivery)  Goal: Nausea and Vomiting Relief  Outcome: Ongoing, Progressing     Problem: Postoperative Urinary Retention (Postpartum  Delivery)  Goal: Effective Urinary Elimination  Outcome: Ongoing, Progressing     Problem: Breastfeeding  Goal: Effective Breastfeeding  Outcome: Ongoing, Progressing   Goal Outcome Evaluation:  Plan of Care Reviewed With: patient

## 2022-05-13 VITALS
DIASTOLIC BLOOD PRESSURE: 87 MMHG | OXYGEN SATURATION: 98 % | WEIGHT: 157 LBS | RESPIRATION RATE: 18 BRPM | SYSTOLIC BLOOD PRESSURE: 143 MMHG | BODY MASS INDEX: 29.64 KG/M2 | HEIGHT: 61 IN | TEMPERATURE: 98.4 F | HEART RATE: 82 BPM

## 2022-05-13 PROCEDURE — 0503F POSTPARTUM CARE VISIT: CPT | Performed by: OBSTETRICS & GYNECOLOGY

## 2022-05-13 RX ORDER — ACETAMINOPHEN 325 MG/1
650 TABLET ORAL EVERY 6 HOURS PRN
Qty: 30 TABLET | Refills: 1 | Status: SHIPPED | OUTPATIENT
Start: 2022-05-13

## 2022-05-13 RX ORDER — OXYCODONE HYDROCHLORIDE 5 MG/1
TABLET ORAL
Qty: 5 TABLET | Refills: 0 | Status: SHIPPED | OUTPATIENT
Start: 2022-05-13

## 2022-05-13 RX ORDER — IBUPROFEN 800 MG/1
800 TABLET ORAL EVERY 8 HOURS PRN
Qty: 30 TABLET | Refills: 1 | Status: SHIPPED | OUTPATIENT
Start: 2022-05-13 | End: 2022-05-23

## 2022-05-13 RX ORDER — LABETALOL 200 MG/1
200 TABLET, FILM COATED ORAL 2 TIMES DAILY
Qty: 60 TABLET | Refills: 0 | Status: SHIPPED | OUTPATIENT
Start: 2022-05-13

## 2022-05-13 RX ORDER — DOCUSATE SODIUM 100 MG/1
100 CAPSULE, LIQUID FILLED ORAL 2 TIMES DAILY PRN
Qty: 60 CAPSULE | Refills: 1 | Status: SHIPPED | OUTPATIENT
Start: 2022-05-13

## 2022-05-13 RX ADMIN — ACETAMINOPHEN 650 MG: 325 TABLET ORAL at 08:45

## 2022-05-13 RX ADMIN — IBUPROFEN 600 MG: 600 TABLET ORAL at 06:18

## 2022-05-13 RX ADMIN — DOCUSATE SODIUM 100 MG: 100 CAPSULE, LIQUID FILLED ORAL at 08:45

## 2022-05-13 RX ADMIN — SERTRALINE HYDROCHLORIDE 50 MG: 50 TABLET ORAL at 08:45

## 2022-05-13 RX ADMIN — LABETALOL HYDROCHLORIDE 100 MG: 100 TABLET, FILM COATED ORAL at 08:45

## 2022-05-13 RX ADMIN — ACETAMINOPHEN 650 MG: 325 TABLET ORAL at 03:14

## 2022-05-13 RX ADMIN — OXYCODONE HYDROCHLORIDE 5 MG: 5 TABLET ORAL at 03:16

## 2022-05-13 RX ADMIN — IBUPROFEN 600 MG: 600 TABLET ORAL at 00:38

## 2022-05-13 NOTE — PLAN OF CARE
Patient to be discharged home. Education completed. Patient verbalizes understanding and denies any questions at this time    Problem: Adult Inpatient Plan of Care  Goal: Plan of Care Review  Outcome: Met  Goal: Patient-Specific Goal (Individualized)  Outcome: Met  Goal: Absence of Hospital-Acquired Illness or Injury  Outcome: Met  Goal: Optimal Comfort and Wellbeing  Outcome: Met  Goal: Readiness for Transition of Care  Outcome: Met     Problem: Adjustment to Role Transition (Postpartum  Delivery)  Goal: Successful Maternal Role Transition  Outcome: Met     Problem: Bleeding (Postpartum  Delivery)  Goal: Hemostasis  Outcome: Met     Problem: Infection (Postpartum  Delivery)  Goal: Absence of Infection Signs and Symptoms  Outcome: Met     Problem: Pain (Postpartum  Delivery)  Goal: Acceptable Pain Control  Outcome: Met     Problem: Postoperative Nausea and Vomiting (Postpartum  Delivery)  Goal: Nausea and Vomiting Relief  Outcome: Met     Problem: Postoperative Urinary Retention (Postpartum  Delivery)  Goal: Effective Urinary Elimination  Outcome: Met     Problem: Breastfeeding  Goal: Effective Breastfeeding  Outcome: Met   Goal Outcome Evaluation:

## 2022-05-13 NOTE — LACTATION NOTE
LC in to follow up with breastfeeding progress. patient continues to exclusively breastfeed infant. She states infant has begun to clusterfeed and her nipples are getting sore. She states the nipple cream has been effective for her  Comfort. No trauma seen to her nipples. Patient is planning on discharge today. LC discussed normal infant output patterns to expect and unlimited time/access to the breast but if infant is not waking by 3 hours to wake and feed using measures shown in the hospital. LC discussed checking to make sure new medications are safe to breastfeed. LC discussed alcohol use and cigarette/second hand smoke around baby and breastfeeding and discussed the impact of street drugs on infants and breastfeeding. LC used the page in the breastfeeding guide to discuss harmful effects of these. Breastfeeding/Lactation expectations and anticipatory guidance discussed for the next two weeks . LC discussed nipple care, plugged ducts, engorgement, and breast infection. LC encouraged mom to see pediatrician two days from discharge for follow up. Patient has a breastpump for home use and LC discussed good pumping guidelines and normal expectations with pumping and storage and preparation of ebm for feedings. LC discussed breastfeeding/lactation resources after discharge and when to call the doctor. Patient showed good understanding.

## 2022-05-13 NOTE — PLAN OF CARE
Problem: Adult Inpatient Plan of Care  Goal: Plan of Care Review  Outcome: Ongoing, Progressing  Flowsheets (Taken 2022 0558)  Plan of Care Reviewed With: patient  Goal: Patient-Specific Goal (Individualized)  Outcome: Ongoing, Progressing  Goal: Absence of Hospital-Acquired Illness or Injury  Outcome: Ongoing, Progressing  Intervention: Identify and Manage Fall Risk  Recent Flowsheet Documentation  Taken 2022 by Olivia Espinoza RN  Safety Promotion/Fall Prevention:   assistive device/personal items within reach   clutter free environment maintained   nonskid shoes/slippers when out of bed   safety round/check completed   room organization consistent  Intervention: Prevent and Manage VTE (Venous Thromboembolism) Risk  Recent Flowsheet Documentation  Taken 2022 by Olivia Espinoza RN  Activity Management: up ad regina  VTE Prevention/Management:   bilateral   sequential compression devices off  Intervention: Prevent Infection  Recent Flowsheet Documentation  Taken 2022 by Olivia Espinoza RN  Infection Prevention: visitors restricted/screened  Goal: Optimal Comfort and Wellbeing  Outcome: Ongoing, Progressing  Intervention: Monitor Pain and Promote Comfort  Recent Flowsheet Documentation  Taken 2022 0314 by Olivia Espinoza RN  Pain Management Interventions: see MAR  Intervention: Provide Person-Centered Care  Recent Flowsheet Documentation  Taken 2022 by Olivia Espinoza RN  Trust Relationship/Rapport:   care explained   choices provided   questions encouraged   thoughts/feelings acknowledged  Goal: Readiness for Transition of Care  Outcome: Ongoing, Progressing     Problem: Adjustment to Role Transition (Postpartum  Delivery)  Goal: Successful Maternal Role Transition  Outcome: Ongoing, Progressing  Intervention: Support Maternal Role Transition  Recent Flowsheet Documentation  Taken 2022 by Olivia Espinoza RN  Supportive Measures: decision-making  supported  Parent/Child Attachment Promotion:   rooming-in promoted   caring behavior modeled     Problem: Bleeding (Postpartum  Delivery)  Goal: Hemostasis  Outcome: Ongoing, Progressing     Problem: Infection (Postpartum  Delivery)  Goal: Absence of Infection Signs and Symptoms  Outcome: Ongoing, Progressing     Problem: Pain (Postpartum  Delivery)  Goal: Acceptable Pain Control  Outcome: Ongoing, Progressing  Intervention: Prevent or Manage Pain  Recent Flowsheet Documentation  Taken 2022 0314 by Olivia Espinoza RN  Pain Management Interventions: see MAR     Problem: Postoperative Nausea and Vomiting (Postpartum  Delivery)  Goal: Nausea and Vomiting Relief  Outcome: Ongoing, Progressing     Problem: Postoperative Urinary Retention (Postpartum  Delivery)  Goal: Effective Urinary Elimination  Outcome: Ongoing, Progressing     Problem: Breastfeeding  Goal: Effective Breastfeeding  Outcome: Ongoing, Progressing  Intervention: Support Exclusive Breastfeeding Success  Recent Flowsheet Documentation  Taken 2022 010 by Olivia Espinoza RN  Supportive Measures: decision-making supported  Breastfeeding Support: infant-mother separation minimized  Intervention: Promote Effective Breastfeeding  Recent Flowsheet Documentation  Taken 2022 by Olivia Espinoza RN  Breastfeeding Assistance: support offered  Parent/Child Attachment Promotion:   rooming-in promoted   caring behavior modeled   Goal Outcome Evaluation:  Plan of Care Reviewed With: patient

## 2022-05-13 NOTE — PROGRESS NOTES
PostPartum/PostOp PROGRESS NOTE        Subjective:  • Patient has no complaints  • Pain controlled  • Tolerating a regular diet  • Passing flatus  • Ambulating  • Urinating spontaneously  • Lochia decreasing, no bleeding concerns  • Denies HA, vision change, or RUQ/epigastric pain  • No lightheadedness or dizziness    Objective:   Vitals: reviewed  General- NAD, alert and oriented, appropriate  Psych- Normal mood, good memory  CV/Resp- CV- Regular rhythm, no murnurs and Resp- CTA to bases, no wheezes  Abdomen- Fundus firm, non tender, Soft, non distended, non tender, normal active bowel sounds, Incision clean, dry, intact, Staples in place and Fundus at U-1  Ext/DTRs- No edema, bilaterally equal, no signs of DVT    Results from last 7 days   Lab Units 05/12/22  0711 05/11/22  0710   WBC 10*3/mm3 7.55 6.39   HEMOGLOBIN g/dL 9.0* 10.2*   HEMATOCRIT % 28.7* 31.6*   PLATELETS 10*3/mm3 115* 136*       Results from last 7 days   Lab Units 05/11/22  0710   GLUCOSE mg/dL 93   CREATININE mg/dL 0.58   SODIUM mmol/L 139   POTASSIUM mmol/L 2.9*   CHLORIDE mmol/L 102   AST (SGOT) U/L 22   ALT (SGPT) U/L 18       Assessment:    Post-partum/postop Day:  2   Borderline elev BP w known CHTN, asymptomatic    Plan:   • Routine postpartum/postop care  • Discharge home  • DC meds reviewed  • Follow up scheduled  • PP/PO precautions given  • HTN precautions reviewed in detail.  Questions answered.  RTO/ER for HA not relieved w tylenol, vision changes, epig/RUQ pain, or Bps elevated at home.  • Will increase labetalol to 200mg BID              Electronically signed by Lia Guzman DO, 05/13/22, 8:21 AM EDT.

## 2022-05-23 ENCOUNTER — TELEPHONE (OUTPATIENT)
Dept: OBSTETRICS AND GYNECOLOGY | Facility: HOSPITAL | Age: 32
End: 2022-05-23

## 2022-06-09 PROBLEM — Z98.891 H/O: C-SECTION: Status: RESOLVED | Noted: 2022-05-11 | Resolved: 2022-06-09

## 2022-06-09 PROBLEM — O99.019 MATERNAL ANEMIA IN PREGNANCY, ANTEPARTUM: Status: RESOLVED | Noted: 2022-03-22 | Resolved: 2022-06-09

## 2022-07-02 PROBLEM — O99.113 BENIGN GESTATIONAL THROMBOCYTOPENIA IN THIRD TRIMESTER (HCC): Status: RESOLVED | Noted: 2022-04-18 | Resolved: 2022-07-02

## 2022-07-02 PROBLEM — O34.593 ABNORMALITY OF UTERUS DURING PREGNANCY IN THIRD TRIMESTER: Status: RESOLVED | Noted: 2022-05-11 | Resolved: 2022-07-02

## 2022-07-02 PROBLEM — O10.919 CHRONIC HYPERTENSION AFFECTING PREGNANCY: Status: RESOLVED | Noted: 2022-01-05 | Resolved: 2022-07-02

## 2022-07-02 PROBLEM — F32.A DEPRESSION: Status: RESOLVED | Noted: 2022-05-11 | Resolved: 2022-07-02

## 2022-07-02 PROBLEM — D69.6 BENIGN GESTATIONAL THROMBOCYTOPENIA IN THIRD TRIMESTER (HCC): Status: RESOLVED | Noted: 2022-04-18 | Resolved: 2022-07-02

## 2023-07-10 NOTE — ANESTHESIA POSTPROCEDURE EVALUATION
Patient: Sia Ramos    Procedure Summary     Date: 22 Room / Location: [facility] LABOR DELIVERY  [facility] LABOR DELIVERY    Anesthesia Start: 903 Anesthesia Stop:     Procedure:  SECTION REPEAT (N/A Abdomen) Diagnosis: (REPEAT, 38/0, [facility] IN OR)    Surgeons: Lia Guzman DO Provider: Philip Snow MD    Anesthesia Type: spinal ASA Status: 2          Anesthesia Type: spinal    Vitals  Vitals Value Taken Time   /79 22 1215   Temp 36.6 °C (97.8 °F) 22 1040   Pulse 75 22 1220   Resp 19 22 1040   SpO2 95 % 22 1141   Vitals shown include unvalidated device data.        Post Anesthesia Care and Evaluation    Patient location during evaluation: bedside  Patient participation: complete - patient participated  Level of consciousness: awake  Pain management: adequate  Airway patency: patent  Anesthetic complications: No anesthetic complications  PONV Status: none  Cardiovascular status: acceptable and stable  Respiratory status: acceptable and room air  Hydration status: acceptable    Comments: An Anesthesiologist personally participated in the most demanding procedures (including induction and emergence if applicable) in the anesthesia plan, monitored the course of anesthesia administration at frequent intervals and remained physically present and available for immediate diagnosis and treatment of emergencies.            
70 year old woman with cholecystitis, jaundice, and failed ERCP at [facility].     Plan for EUS/ERCP.   Surgical evaluation.

## 2024-10-21 NOTE — PROGRESS NOTES
OB FOLLOW UP        Chief Complaint   Patient presents with   • Follow-up     ob follow up        Subjective:   • No complaints  • Denies HA, vision changes/scotomata, RUQ/epigastric pain or UE/facial edema  • states nausea is much improved.    Objective:  BP (!) 147/106   Wt 62.1 kg (137 lb)   LMP 08/18/2021   BMI 25.47 kg/m²   Uterine Size: size equals dates  FHT: 110-160 BPM    See OB flow for LE edema, cvx exam if performed, and Upro/Uglu    Consult with Dr. Bishop re: blood pressure, will start ASA 81 mg and labetalol  Assessment and Plan:  12w6d  Reassuring pregnancy progress.  Questions answered.  Diagnoses and all orders for this visit:    1. 12 weeks gestation of pregnancy (Primary)  -     POC Urinalysis Dipstick    2. Supervision of other normal pregnancy, antepartum  -     INVITAE NIPS  -     Comprehensive Metabolic Panel  -     Protein / Creatinine Ratio, Urine - Urine, Clean Catch    3. Essential hypertension  -     aspirin (aspirin) 81 MG EC tablet; Take 1 tablet by mouth Daily.  Dispense: 90 tablet; Refill: 2  -     labetalol (NORMODYNE) 100 MG tablet; Take 1 tablet by mouth 2 (Two) Times a Day.  Dispense: 60 tablet; Refill: 5      Counseling:    • Second trimester precautions  • HTN precautions, HA, vision change, RUQ/epigastric pain, edema  • Discussed need to start medication for blood pressure, verbalizes understanding  • Continue PNV.  Importance of healthy eating and exercise.    Return in about 1 week (around 11/23/2021) for Blood pressure check.            Ajith Barrera, APRN  11/16/2021    [facility] [place] [facility] [address]  [facility]  [address] [place] [id number]  Dept: [phone]  Loc: [phone]   
n/a

## 2025-04-10 ENCOUNTER — HOSPITAL ENCOUNTER (EMERGENCY)
Facility: HOSPITAL | Age: 35
Discharge: HOME | End: 2025-04-10
Attending: EMERGENCY MEDICINE
Payer: COMMERCIAL

## 2025-04-10 ENCOUNTER — TELEPHONE (OUTPATIENT)
Dept: OBSTETRICS AND GYNECOLOGY | Age: 35
End: 2025-04-10
Payer: COMMERCIAL

## 2025-04-10 VITALS
WEIGHT: 114.86 LBS | OXYGEN SATURATION: 100 % | TEMPERATURE: 98 F | SYSTOLIC BLOOD PRESSURE: 119 MMHG | DIASTOLIC BLOOD PRESSURE: 66 MMHG | HEIGHT: 61 IN | BODY MASS INDEX: 21.69 KG/M2 | RESPIRATION RATE: 18 BRPM | HEART RATE: 68 BPM

## 2025-04-10 DIAGNOSIS — O36.80X0 PREGNANCY WITH INCONCLUSIVE FETAL VIABILITY, SINGLE OR UNSPECIFIED FETUS: Primary | ICD-10-CM

## 2025-04-10 DIAGNOSIS — O21.0 HYPEREMESIS ARISING DURING PREGNANCY: Primary | ICD-10-CM

## 2025-04-10 LAB
ALBUMIN SERPL-MCNC: 4.5 G/DL (ref 3.5–5.2)
ALBUMIN/GLOB SERPL: 1.4 G/DL
ALP SERPL-CCNC: 45 U/L (ref 39–117)
ALT SERPL W P-5'-P-CCNC: 6 U/L (ref 1–33)
ANION GAP SERPL CALCULATED.3IONS-SCNC: 12.7 MMOL/L (ref 5–15)
AST SERPL-CCNC: 14 U/L (ref 1–32)
BACTERIA UR QL AUTO: ABNORMAL /HPF
BASOPHILS # BLD AUTO: 0.01 10*3/MM3 (ref 0–0.2)
BASOPHILS NFR BLD AUTO: 0.2 % (ref 0–1.5)
BILIRUB SERPL-MCNC: 0.3 MG/DL (ref 0–1.2)
BILIRUB UR QL STRIP: NEGATIVE
BUN SERPL-MCNC: 6 MG/DL (ref 6–20)
BUN/CREAT SERPL: 11.8 (ref 7–25)
CALCIUM SPEC-SCNC: 9.3 MG/DL (ref 8.6–10.5)
CHLORIDE SERPL-SCNC: 98 MMOL/L (ref 98–107)
CLARITY UR: CLEAR
CO2 SERPL-SCNC: 25.3 MMOL/L (ref 22–29)
COLOR UR: YELLOW
CREAT SERPL-MCNC: 0.51 MG/DL (ref 0.57–1)
DEPRECATED RDW RBC AUTO: 55.2 FL (ref 37–54)
EGFRCR SERPLBLD CKD-EPI 2021: 124.2 ML/MIN/1.73
EOSINOPHIL # BLD AUTO: 0.01 10*3/MM3 (ref 0–0.4)
EOSINOPHIL NFR BLD AUTO: 0.2 % (ref 0.3–6.2)
ERYTHROCYTE [DISTWIDTH] IN BLOOD BY AUTOMATED COUNT: 16.9 % (ref 12.3–15.4)
GLOBULIN UR ELPH-MCNC: 3.3 GM/DL
GLUCOSE SERPL-MCNC: 83 MG/DL (ref 65–99)
GLUCOSE UR STRIP-MCNC: NEGATIVE MG/DL
HCG INTACT+B SERPL-ACNC: NORMAL MIU/ML
HCT VFR BLD AUTO: 34.6 % (ref 34–46.6)
HGB BLD-MCNC: 11.1 G/DL (ref 12–15.9)
HGB UR QL STRIP.AUTO: NEGATIVE
HOLD SPECIMEN: NORMAL
HOLD SPECIMEN: NORMAL
HYALINE CASTS UR QL AUTO: ABNORMAL /LPF
IMM GRANULOCYTES # BLD AUTO: 0.01 10*3/MM3 (ref 0–0.05)
IMM GRANULOCYTES NFR BLD AUTO: 0.2 % (ref 0–0.5)
KETONES UR QL STRIP: ABNORMAL
LEUKOCYTE ESTERASE UR QL STRIP.AUTO: NEGATIVE
LYMPHOCYTES # BLD AUTO: 1.85 10*3/MM3 (ref 0.7–3.1)
LYMPHOCYTES NFR BLD AUTO: 40 % (ref 19.6–45.3)
MCH RBC QN AUTO: 28.2 PG (ref 26.6–33)
MCHC RBC AUTO-ENTMCNC: 32.1 G/DL (ref 31.5–35.7)
MCV RBC AUTO: 88 FL (ref 79–97)
MONOCYTES # BLD AUTO: 0.33 10*3/MM3 (ref 0.1–0.9)
MONOCYTES NFR BLD AUTO: 7.1 % (ref 5–12)
NEUTROPHILS NFR BLD AUTO: 2.42 10*3/MM3 (ref 1.7–7)
NEUTROPHILS NFR BLD AUTO: 52.3 % (ref 42.7–76)
NITRITE UR QL STRIP: NEGATIVE
NRBC BLD AUTO-RTO: 0 /100 WBC (ref 0–0.2)
PH UR STRIP.AUTO: 8 [PH] (ref 5–8)
PLATELET # BLD AUTO: 190 10*3/MM3 (ref 140–450)
PMV BLD AUTO: 11.9 FL (ref 6–12)
POTASSIUM SERPL-SCNC: 3.4 MMOL/L (ref 3.5–5.2)
PROT SERPL-MCNC: 7.8 G/DL (ref 6–8.5)
PROT UR QL STRIP: ABNORMAL
RBC # BLD AUTO: 3.93 10*6/MM3 (ref 3.77–5.28)
RBC # UR STRIP: ABNORMAL /HPF
REF LAB TEST METHOD: ABNORMAL
SODIUM SERPL-SCNC: 136 MMOL/L (ref 136–145)
SP GR UR STRIP: 1.03 (ref 1–1.03)
SQUAMOUS #/AREA URNS HPF: ABNORMAL /HPF
UROBILINOGEN UR QL STRIP: ABNORMAL
WBC # UR STRIP: ABNORMAL /HPF
WBC NRBC COR # BLD AUTO: 4.63 10*3/MM3 (ref 3.4–10.8)
WHOLE BLOOD HOLD COAG: NORMAL
WHOLE BLOOD HOLD SPECIMEN: NORMAL

## 2025-04-10 PROCEDURE — 84702 CHORIONIC GONADOTROPIN TEST: CPT | Performed by: EMERGENCY MEDICINE

## 2025-04-10 PROCEDURE — 36415 COLL VENOUS BLD VENIPUNCTURE: CPT | Performed by: EMERGENCY MEDICINE

## 2025-04-10 PROCEDURE — 99283 EMERGENCY DEPT VISIT LOW MDM: CPT

## 2025-04-10 PROCEDURE — 96374 THER/PROPH/DIAG INJ IV PUSH: CPT

## 2025-04-10 PROCEDURE — 25010000002 ONDANSETRON PER 1 MG: Performed by: NURSE PRACTITIONER

## 2025-04-10 PROCEDURE — 81001 URINALYSIS AUTO W/SCOPE: CPT | Performed by: EMERGENCY MEDICINE

## 2025-04-10 PROCEDURE — 25810000003 SODIUM CHLORIDE 0.9 % SOLUTION: Performed by: NURSE PRACTITIONER

## 2025-04-10 PROCEDURE — 85025 COMPLETE CBC W/AUTO DIFF WBC: CPT | Performed by: EMERGENCY MEDICINE

## 2025-04-10 PROCEDURE — 80053 COMPREHEN METABOLIC PANEL: CPT | Performed by: EMERGENCY MEDICINE

## 2025-04-10 RX ORDER — ONDANSETRON 2 MG/ML
4 INJECTION INTRAMUSCULAR; INTRAVENOUS ONCE
Status: COMPLETED | OUTPATIENT
Start: 2025-04-10 | End: 2025-04-10

## 2025-04-10 RX ORDER — ONDANSETRON 4 MG/1
4 TABLET, ORALLY DISINTEGRATING ORAL EVERY 6 HOURS PRN
Qty: 60 TABLET | Refills: 0 | Status: SHIPPED | OUTPATIENT
Start: 2025-04-10 | End: 2025-04-21

## 2025-04-10 RX ORDER — SODIUM CHLORIDE 0.9 % (FLUSH) 0.9 %
10 SYRINGE (ML) INJECTION AS NEEDED
Status: DISCONTINUED | OUTPATIENT
Start: 2025-04-10 | End: 2025-04-10 | Stop reason: HOSPADM

## 2025-04-10 RX ADMIN — ONDANSETRON 4 MG: 2 INJECTION INTRAMUSCULAR; INTRAVENOUS at 11:13

## 2025-04-10 RX ADMIN — SODIUM CHLORIDE 1000 ML: 9 INJECTION, SOLUTION INTRAVENOUS at 11:10

## 2025-04-10 NOTE — ED PROVIDER NOTES
Time: 10:38 AM EDT  Date of encounter:  4/10/2025  Independent Historian/Clinical History and Information was obtained by:   Patient    History is limited by: N/A    Chief Complaint: hyperemesis      History of Present Illness:  Patient is a 35 y.o. year old female who presents to the emergency department for evaluation of hyperemesis x 3 to 4 days.  Patient reports unable to keep liquids or solids down at this time.      Patient Care Team  Primary Care Provider: Provider, No Known    Past Medical History:     No Known Allergies  Past Medical History:   Diagnosis Date    Benign gestational thrombocytopenia in third trimester 2022     Chronic hypertension affecting pregnancy 2022    Depression 2022    Essential hypertension 2021    Extremely THIN CEE at CS 2022    I have dw pt and FOB due to thinness and unable to place two layer closer strongly consider this be last child, if she does conceive, I'd rec at least 2years to heal and del at 37+0 sooner prn mat/fetal indications    Migraine      Past Surgical History:   Procedure Laterality Date     SECTION      x3     SECTION N/A 2022    Procedure:  SECTION REPEAT;  Surgeon: Lia Guzman DO;  Location: [facility] LABOR DELIVERY;  Service: Obstetrics/Gynecology;  Laterality: N/A;     Family History   Problem Relation Age of Onset    Heart disease Other     Diabetes Other        Home Medications:  Prior to Admission medications    Medication Sig Start Date End Date Taking? Authorizing Provider   acetaminophen (Tylenol) 325 MG tablet Take 2 tablets by mouth Every 6 (Six) Hours As Needed for Mild Pain  (alternate with motrin). 22   Lia Guzman DO   docusate sodium (Colace) 100 MG capsule Take 1 capsule by mouth 2 (Two) Times a Day As Needed for Constipation. 22   Lia Guzman DO   ferrous sulfate 325 (65 FE) MG tablet Take 1 tablet by mouth Every Other Day. 3/22/22   Lia Guzman DO   labetalol  "(NORMODYNE) 200 MG tablet Take 1 tablet by mouth 2 (Two) Times a Day. 5/13/22   Lia Guzman DO   Lancets (freestyle) lancets USE AS DIRECTED TO TEST BLOOD SUGAR FOUR TIMES A DAY 4/27/22   Lia Guzman DO   oxyCODONE (ROXICODONE) 5 MG immediate release tablet Take 1-2 tabs every 4hrs as needed for pain UNCONTROLLED w motrin (ibuprofen) and/or tylenol as directed 5/13/22   Lia Guzman DO   prenatal vitamin (prenatal, CLASSIC, vitamin) tablet Take 1 tablet by mouth Daily.    Provider, MD Wili   sertraline (ZOLOFT) 50 MG tablet Take 1 tablet by mouth Daily. 10/19/21   Provider, MD Wili        Social History:   Social History     Tobacco Use    Smoking status: Never    Smokeless tobacco: Never   Substance Use Topics    Alcohol use: Never    Drug use: Never         Review of Systems:  Review of Systems   Constitutional:  Positive for appetite change (no appetite secondary to vomiting). Negative for fever.   HENT: Negative.     Eyes: Negative.    Respiratory: Negative.     Cardiovascular: Negative.    Gastrointestinal:  Positive for nausea and vomiting (liquids and solids). Negative for abdominal pain, constipation and diarrhea.   Endocrine: Negative.    Genitourinary:  Negative for difficulty urinating, dysuria, pelvic pain and vaginal pain.   Musculoskeletal: Negative.    Allergic/Immunologic: Negative.    Neurological: Negative.    Hematological: Negative.    Psychiatric/Behavioral: Negative.          Physical Exam:  /86 (BP Location: Left arm, Patient Position: Sitting)   Pulse 80   Temp 98.2 °F (36.8 °C) (Oral)   Resp 18   Ht 154.9 cm (61\")   Wt 52.1 kg (114 lb 13.8 oz)   LMP 01/14/2025 (Approximate)   SpO2 100%   Breastfeeding No   BMI 21.70 kg/m²     Physical Exam  Vitals reviewed.   Constitutional:       Appearance: She is ill-appearing.   HENT:      Head: Normocephalic and atraumatic.      Nose: Nose normal.      Mouth/Throat:      Mouth: Mucous membranes are moist.   Eyes:      " Conjunctiva/sclera: Conjunctivae normal.   Cardiovascular:      Rate and Rhythm: Normal rate and regular rhythm.      Pulses: Normal pulses.      Heart sounds: Normal heart sounds.   Pulmonary:      Effort: Pulmonary effort is normal.      Breath sounds: Normal breath sounds.   Abdominal:      General: Abdomen is flat. Bowel sounds are normal. There is no distension.      Palpations: Abdomen is soft.      Tenderness: There is no abdominal tenderness.   Musculoskeletal:         General: Normal range of motion.      Cervical back: Normal range of motion and neck supple.   Skin:     General: Skin is warm and dry.      Capillary Refill: Capillary refill takes less than 2 seconds.   Neurological:      General: No focal deficit present.      Mental Status: She is alert and oriented to person, place, and time.   Psychiatric:         Mood and Affect: Mood normal.         Behavior: Behavior normal.                    Medical Decision Making:      Comorbidities that affect care:    None    External Notes reviewed:    None      The following orders were placed and all results were independently analyzed by me:  Orders Placed This Encounter   Procedures    Comprehensive Metabolic Panel    [place] Draw    hCG, Quantitative, Pregnancy    CBC Auto Differential    Urinalysis With Microscopic If Indicated (No Culture) - Urine, Clean Catch    Urinalysis, Microscopic Only - Urine, Clean Catch    Insert peripheral IV    CBC & Differential    Green Top (Gel)    Lavender Top    Gold Top - SST    Light Blue Top       Medications Given in the Emergency Department:  Medications   sodium chloride 0.9 % flush 10 mL (has no administration in time range)   sodium chloride 0.9 % bolus 1,000 mL (1,000 mL Intravenous New Bag 4/10/25 1110)   ondansetron (ZOFRAN) injection 4 mg (4 mg Intravenous Given 4/10/25 1113)        ED Course:         Labs:    Lab Results (last 24 hours)       Procedure Component Value Units Date/Time    CBC & Differential  [[id number]]  (Abnormal) Collected: 04/10/25 1027    Specimen: Blood from Arm, Right Updated: 04/10/25 1039    Narrative:      The following orders were created for panel order CBC & Differential.  Procedure                               Abnormality         Status                     ---------                               -----------         ------                     CBC Auto Differential[[id number]]        Abnormal            Final result                 Please view results for these tests on the individual orders.    Comprehensive Metabolic Panel [[id number]]  (Abnormal) Collected: 04/10/25 1027    Specimen: Blood from Arm, Right Updated: 04/10/25 1101     Glucose 83 mg/dL      BUN 6 mg/dL      Creatinine 0.51 mg/dL      Sodium 136 mmol/L      Potassium 3.4 mmol/L      Chloride 98 mmol/L      CO2 25.3 mmol/L      Calcium 9.3 mg/dL      Total Protein 7.8 g/dL      Albumin 4.5 g/dL      ALT (SGPT) 6 U/L      AST (SGOT) 14 U/L      Alkaline Phosphatase 45 U/L      Total Bilirubin 0.3 mg/dL      Globulin 3.3 gm/dL      A/G Ratio 1.4 g/dL      BUN/Creatinine Ratio 11.8     Anion Gap 12.7 mmol/L      eGFR 124.2 mL/min/1.73     Narrative:      GFR Categories in Chronic Kidney Disease (CKD)      GFR Category          GFR (mL/min/1.73)    Interpretation  G1                     90 or greater         Normal or high (1)  G2                      60-89                Mild decrease (1)  G3a                   45-59                Mild to moderate decrease  G3b                   30-44                Moderate to severe decrease  G4                    15-29                Severe decrease  G5                    14 or less           Kidney failure          (1)In the absence of evidence of kidney disease, neither GFR category G1 or G2 fulfill the criteria for CKD.    eGFR calculation 2021 CKD-EPI creatinine equation, which does not include race as a factor    hCG, Quantitative, Pregnancy [[id number]] Collected: 04/10/25 1027    Specimen:  Blood from Arm, Right Updated: 04/10/25 1113     HCG Quantitative 78,149.00 mIU/mL     Narrative:      HCG Ranges by Gestational Age    Females - non-pregnant premenopausal   </= 1mIU/mL HCG  Females - postmenopausal               </= 7mIU/mL HCG    3 Weeks       5.4   -      72 mIU/mL  4 Weeks      10.2   -     708 mIU/mL  5 Weeks       217   -   8,245 mIU/mL  6 Weeks       152   -  32,177 mIU/mL  7 Weeks     4,059   - 153,767 mIU/mL  8 Weeks    31,366   - 149,094 mIU/mL  9 Weeks    59,109   - 135,901 mIU/mL  10 Weeks   44,186   - 170,409 mIU/mL  12 Weeks   27,107   - 201,615 mIU/mL  14 Weeks   24,302   -  93,646 mIU/mL  15 Weeks   12,540   -  69,747 mIU/mL  16 Weeks    8,904   -  55,332 mIU/mL  17 Weeks    8,240   -  51,793 mIU/mL  18 Weeks    9,649   -  55,271 mIU/mL      CBC Auto Differential [[id number]]  (Abnormal) Collected: 04/10/25 1027    Specimen: Blood from Arm, Right Updated: 04/10/25 1039     WBC 4.63 10*3/mm3      RBC 3.93 10*6/mm3      Hemoglobin 11.1 g/dL      Hematocrit 34.6 %      MCV 88.0 fL      MCH 28.2 pg      MCHC 32.1 g/dL      RDW 16.9 %      RDW-SD 55.2 fl      MPV 11.9 fL      Platelets 190 10*3/mm3      Neutrophil % 52.3 %      Lymphocyte % 40.0 %      Monocyte % 7.1 %      Eosinophil % 0.2 %      Basophil % 0.2 %      Immature Grans % 0.2 %      Neutrophils, Absolute 2.42 10*3/mm3      Lymphocytes, Absolute 1.85 10*3/mm3      Monocytes, Absolute 0.33 10*3/mm3      Eosinophils, Absolute 0.01 10*3/mm3      Basophils, Absolute 0.01 10*3/mm3      Immature Grans, Absolute 0.01 10*3/mm3      nRBC 0.0 /100 WBC     Urinalysis With Microscopic If Indicated (No Culture) - Urine, Clean Catch [[id number]]  (Abnormal) Collected: 04/10/25 1051    Specimen: Urine, Clean Catch Updated: 04/10/25 1059     Color, UA Yellow     Appearance, UA Clear     pH, UA 8.0     Specific Gravity, UA 1.027     Glucose, UA Negative     Ketones, UA >=160 mg/dL (4+)     Bilirubin, UA Negative     Blood, UA Negative      Protein, UA 30 mg/dL (1+)     Leuk Esterase, UA Negative     Nitrite, UA Negative     Urobilinogen, UA 1.0 E.U./dL    Urinalysis, Microscopic Only - Urine, Clean Catch [[id number]]  (Abnormal) Collected: 04/10/25 1051    Specimen: Urine, Clean Catch Updated: 04/10/25 1059     RBC, UA 3-5 /HPF      WBC, UA 0-2 /HPF      Bacteria, UA None Seen /HPF      Squamous Epithelial Cells, UA 3-6 /HPF      Hyaline Casts, UA 0-2 /LPF      Methodology Automated Microscopy             Imaging:    No Radiology Exams Resulted Within Past 24 Hours      Differential Diagnosis and Discussion:    Vomiting: Differential diagnosis includes but is not limited to migraine, labyrinthine disorders, psychogenic, metabolic and endocrine causes, peptic ulcer, gastric outlet obstruction, gastritis, gastroenteritis, appendicitis, intestinal obstruction, paralytic ileus, food poisoning, cholecystitis, acute hepatitis, acute pancreatitis, acute febrile illness, and myocardial infarction.    PROCEDURES:    Labs were collected in the emergency department and all labs were reviewed and interpreted by me.    No orders to display       Procedures    MDM  Number of Diagnoses or Management Options  Hyperemesis arising during pregnancy  Diagnosis management comments: The patient comes to the ED for evaluation of vomiting. Emesis is much improved in the ED. The patient was given antiemetics in the ED. The patient is resting comfortably and feels better, is alert and in no distress. Repeat examination is unremarkable and benign; in particular, there's no discomfort at McBurney's point. The history, exam, diagnostic testing, and current condition does not suggest acute appendicitis, bowel obstruction, acute cholecystitis, bowel perforation, major gastrointestinal bleeding, severe diverticulitis, abdominal aortic aneurysm, mesenteric ischemia, volvulus, sepsis, or other significant pathology that warrants further testing, continued ED treatment, admission, or  surgical evaluation at this point. The vital signs have been stable. Bloodwork performed shows no signs of acute renal failure. The patient does not have uncontrollable pain, intractable vomiting, or other significant symptoms. The patient is now able to tolerate po intake in the ED and has passed a po challenge. The patient's condition is stable and appropriate for discharge from the emergency department.                        Patient Care Considerations:    SEPSIS was considered but is NOT present in the emergency department as SIRS criteria is not present.      Consultants/Shared Management Plan:        Social Determinants of Health:    Patient is independent, reliable, and has access to care.       Disposition and Care Coordination:    Discharged: The patient is suitable and stable for discharge with no need for consideration of admission.    I have explained the patient´s condition, diagnoses and treatment plan based on the information available to me at this time. I have answered questions and addressed any concerns. The patient has a good  understanding of the patient´s diagnosis, condition, and treatment plan as can be expected at this point. The vital signs have been stable. The patient´s condition is stable and appropriate for discharge from the emergency department.      The patient will pursue further outpatient evaluation with the primary care physician or other designated or consulting physician as outlined in the discharge instructions. They are agreeable to this plan of care and follow-up instructions have been explained in detail. The patient has received these instructions in written format and has expressed an understanding of the discharge instructions. The patient is aware that any significant change in condition or worsening of symptoms should prompt an immediate return to this or the closest emergency department or call to 911.    Final diagnoses:   Hyperemesis arising during pregnancy         ED Disposition       ED Disposition   Discharge    Condition   Stable    Comment   --               This medical record created using voice recognition software.             Izabella Berry, APRN  04/10/25 1137

## 2025-04-10 NOTE — TELEPHONE ENCOUNTER
"Caller: Sia Ramos \"Lacey\"    Relationship to patient: Self    Best call back number: [phone]    Chief complaint: LMP 01/14 SEEN AT ED 04/10     Type of visit: NEW OB - HAS NOT BEEN SEEN ANYWHERE YET FOR THIS PREGNANCY    Requested date: ASAP      Additional notes:OB PT HAS BEEN SEEN HERE IN THE PAST LAST VISIT WAS IN MAY OF 2022. DR KELLY PT.    PLEASE CALL PT TO SCHEDULE NEW OB AND ADVISE.         "

## 2025-04-10 NOTE — DISCHARGE INSTRUCTIONS
Take medication as directed.  Follow-up with OB within 1 week.  With worsening signs and symptoms return to ER.

## 2025-04-14 NOTE — TELEPHONE ENCOUNTER
Established patient with [facility] of 78,149.00 on 4/10 by ER. Scheduled for New Ob 4/21 with Dr. Guzman. Please sign the attached ultrasound.    Ema, please contact the patient with the date and time of her ultrasound and follow up with Dr. Guzman.

## 2025-04-14 NOTE — TELEPHONE ENCOUNTER
Established patient recently in  ER. LMP 1/14/25. Scheduled for NEW OB with Dr. Guzman 4/29/25. Further recommendations/orders?

## 2025-04-15 NOTE — TELEPHONE ENCOUNTER
Message sent to Ema to update the patient with the new date and time of her ultrasound and follow up.

## 2025-04-21 ENCOUNTER — OFFICE VISIT (OUTPATIENT)
Dept: OBSTETRICS AND GYNECOLOGY | Age: 35
End: 2025-04-21
Payer: COMMERCIAL

## 2025-04-21 VITALS
HEART RATE: 68 BPM | SYSTOLIC BLOOD PRESSURE: 128 MMHG | WEIGHT: 117 LBS | BODY MASS INDEX: 22.11 KG/M2 | DIASTOLIC BLOOD PRESSURE: 89 MMHG

## 2025-04-21 DIAGNOSIS — Z87.59 HISTORY OF GESTATIONAL HYPERTENSION: ICD-10-CM

## 2025-04-21 DIAGNOSIS — Z34.80 SUPERVISION OF OTHER NORMAL PREGNANCY, ANTEPARTUM: Primary | ICD-10-CM

## 2025-04-21 LAB
ABO GROUP BLD: NORMAL
AMPHET+METHAMPHET UR QL: NEGATIVE
AMPHETAMINES UR QL: NEGATIVE
BARBITURATES UR QL SCN: NEGATIVE
BENZODIAZ UR QL SCN: NEGATIVE
BLD GP AB SCN SERPL QL: NEGATIVE
BUPRENORPHINE SERPL-MCNC: NEGATIVE NG/ML
CANNABINOIDS SERPL QL: POSITIVE
COCAINE UR QL: NEGATIVE
CREAT UR-MCNC: 194.3 MG/DL
FENTANYL UR-MCNC: NEGATIVE NG/ML
HBV SURFACE AG SERPL QL IA: NORMAL
HCV AB SER QL: NORMAL
HIV 1+2 AB+HIV1 P24 AG SERPL QL IA: NORMAL
METHADONE UR QL SCN: NEGATIVE
OPIATES UR QL: NEGATIVE
OXYCODONE UR QL SCN: NEGATIVE
PCP UR QL SCN: NEGATIVE
PROT ?TM UR-MCNC: 21.1 MG/DL
PROT/CREAT UR: 0.11 MG/G{CREAT}
RH BLD: POSITIVE
TRICYCLICS UR QL SCN: NEGATIVE

## 2025-04-21 PROCEDURE — 86803 HEPATITIS C AB TEST: CPT | Performed by: NURSE PRACTITIONER

## 2025-04-21 PROCEDURE — 82570 ASSAY OF URINE CREATININE: CPT | Performed by: NURSE PRACTITIONER

## 2025-04-21 PROCEDURE — 80081 OBSTETRIC PANEL INC HIV TSTG: CPT | Performed by: NURSE PRACTITIONER

## 2025-04-21 PROCEDURE — 87086 URINE CULTURE/COLONY COUNT: CPT | Performed by: NURSE PRACTITIONER

## 2025-04-21 PROCEDURE — 80053 COMPREHEN METABOLIC PANEL: CPT | Performed by: NURSE PRACTITIONER

## 2025-04-21 PROCEDURE — 84156 ASSAY OF PROTEIN URINE: CPT | Performed by: NURSE PRACTITIONER

## 2025-04-21 PROCEDURE — 80307 DRUG TEST PRSMV CHEM ANLYZR: CPT | Performed by: NURSE PRACTITIONER

## 2025-04-21 RX ORDER — ASPIRIN 81 MG/1
81 TABLET ORAL DAILY
Qty: 90 TABLET | Refills: 7 | Status: SHIPPED | OUTPATIENT
Start: 2025-04-21

## 2025-04-21 NOTE — PROGRESS NOTES
Venipuncture performed in Left arm (site) by ZAIDA (employee) with good hemostasis. Patient tolerated well. Date 04.21.2025. AF

## 2025-04-21 NOTE — PROGRESS NOTES
GYN Visit    CC:   Chief Complaint   Patient presents with    Follow-up     Review viability ultrasound confirmation        HPI:   35 y.o. Contraception or HRT: Contraception:  None    Patient is here to review viability ultrasound.  No complaints.  Reports LMP of 25, 13w6d.  This is consistent with her ultrasound from this morning which showed an IUP at 13w5d, .    Reports does typically elevated blood pressure during her pregnancies that requires medication.     History: PMHx, Meds, Allergies, PSHx, Social Hx, and POBHx all reviewed and updated.    Review of Systems   Constitutional: Negative.    Genitourinary:  Positive for amenorrhea.       PHYSICAL EXAM:  /89   Pulse 68   Wt 53.1 kg (117 lb)   LMP 2025 (Approximate)   BMI 22.11 kg/m²      Physical Exam  Vitals and nursing note reviewed.   Constitutional:       Appearance: Normal appearance. She is well-developed and well-groomed.   Neurological:      Mental Status: She is alert.   Psychiatric:         Attention and Perception: Attention and perception normal.         Mood and Affect: Affect normal.         Speech: Speech normal.         Behavior: Behavior is cooperative.         Cognition and Memory: Cognition normal.         ASSESSMENT AND PLAN:  Diagnoses and all orders for this visit:    1. Supervision of other normal pregnancy, antepartum (Primary)  Overview:  GUILLERMO finalized: 10/21/25 per LMP, 13-week US and ACOG    Optional testing NIPS,CF/SMA,AFP:  NIPS pending    COVID vaccine:   Flu vaccine:  Tdap vaccine:    Rhogam:  1hr Glucola:  FU RPR w glucola:  ? Desires Sterilization:    Anatomy US:  FU US:    PROBLEM LIST/PLAN:   Hx of gestational HTN - Baseline labs pending, ASA 81 mg daily, BP borderline on 25        Orders:  -     OB Panel With HIV and RPR  -     Urine Drug Screen - Urine, Clean Catch  -     Urine Culture - Urine, Urine, Clean Catch  -     Comprehensive Metabolic Panel  -     Protein / Creatinine Ratio,  Urine - Urine, Clean Catch  -     Richard Panorama Prenatal Test: Chromosomes 13, 18, 21, X & Y: Triploidy 22Q.11.2 Deletion - Blood,  -     aspirin 81 MG EC tablet; Take 1 tablet by mouth Daily.  Dispense: 90 tablet; Refill: 7    2. History of gestational hypertension  Overview:  Baseline labs pending  ASA 81 mg daily          Counseling:  FIRST TRIMESTER precautions provided- return to office/ER for pain and/or vaginal bleeding.    Follow Up:  Return in about 9 days (around 4/30/2025) for Dr. Guzman, [facility], already scheduled.      Ajith Barrera, APRN  04/21/2025    [facility] [address] [facility]  [address] [place] [address] [address] [place] [address]  Dept: [phone]  Dept Fax: [phone]  Loc: [phone]

## 2025-04-22 ENCOUNTER — RESULTS FOLLOW-UP (OUTPATIENT)
Dept: OBSTETRICS AND GYNECOLOGY | Age: 35
End: 2025-04-22
Payer: COMMERCIAL

## 2025-04-22 DIAGNOSIS — O99.019 MATERNAL ANEMIA IN PREGNANCY, ANTEPARTUM: Primary | ICD-10-CM

## 2025-04-22 PROBLEM — R82.5 POSITIVE URINE DRUG SCREEN: Status: ACTIVE | Noted: 2025-04-22

## 2025-04-22 LAB
ALBUMIN SERPL-MCNC: 3.9 G/DL (ref 3.5–5.2)
ALBUMIN/GLOB SERPL: 1.3 G/DL
ALP SERPL-CCNC: 41 U/L (ref 39–117)
ALT SERPL W P-5'-P-CCNC: 10 U/L (ref 1–33)
ANION GAP SERPL CALCULATED.3IONS-SCNC: 10 MMOL/L (ref 5–15)
AST SERPL-CCNC: 14 U/L (ref 1–32)
BASOPHILS # BLD AUTO: 0.01 10*3/MM3 (ref 0–0.2)
BASOPHILS NFR BLD AUTO: 0.2 % (ref 0–1.5)
BILIRUB SERPL-MCNC: 0.2 MG/DL (ref 0–1.2)
BUN SERPL-MCNC: 8 MG/DL (ref 6–20)
BUN/CREAT SERPL: 14.3 (ref 7–25)
CALCIUM SPEC-SCNC: 9 MG/DL (ref 8.6–10.5)
CHLORIDE SERPL-SCNC: 103 MMOL/L (ref 98–107)
CO2 SERPL-SCNC: 23 MMOL/L (ref 22–29)
CREAT SERPL-MCNC: 0.56 MG/DL (ref 0.57–1)
DEPRECATED RDW RBC AUTO: 49 FL (ref 37–54)
EGFRCR SERPLBLD CKD-EPI 2021: 121.5 ML/MIN/1.73
EOSINOPHIL # BLD AUTO: 0.03 10*3/MM3 (ref 0–0.4)
EOSINOPHIL NFR BLD AUTO: 0.5 % (ref 0.3–6.2)
ERYTHROCYTE [DISTWIDTH] IN BLOOD BY AUTOMATED COUNT: 15.7 % (ref 12.3–15.4)
GLOBULIN UR ELPH-MCNC: 3.1 GM/DL
GLUCOSE SERPL-MCNC: 68 MG/DL (ref 65–99)
HCT VFR BLD AUTO: 30.2 % (ref 34–46.6)
HGB BLD-MCNC: 9.7 G/DL (ref 12–15.9)
IMM GRANULOCYTES # BLD AUTO: 0.01 10*3/MM3 (ref 0–0.05)
IMM GRANULOCYTES NFR BLD AUTO: 0.2 % (ref 0–0.5)
LYMPHOCYTES # BLD AUTO: 2.05 10*3/MM3 (ref 0.7–3.1)
LYMPHOCYTES NFR BLD AUTO: 37 % (ref 19.6–45.3)
MCH RBC QN AUTO: 28 PG (ref 26.6–33)
MCHC RBC AUTO-ENTMCNC: 32.1 G/DL (ref 31.5–35.7)
MCV RBC AUTO: 87.3 FL (ref 79–97)
MONOCYTES # BLD AUTO: 0.37 10*3/MM3 (ref 0.1–0.9)
MONOCYTES NFR BLD AUTO: 6.7 % (ref 5–12)
NEUTROPHILS NFR BLD AUTO: 3.07 10*3/MM3 (ref 1.7–7)
NEUTROPHILS NFR BLD AUTO: 55.4 % (ref 42.7–76)
NRBC BLD AUTO-RTO: 0 /100 WBC (ref 0–0.2)
PLATELET # BLD AUTO: 176 10*3/MM3 (ref 140–450)
PMV BLD AUTO: 13 FL (ref 6–12)
POTASSIUM SERPL-SCNC: 3.3 MMOL/L (ref 3.5–5.2)
PROT SERPL-MCNC: 7 G/DL (ref 6–8.5)
RBC # BLD AUTO: 3.46 10*6/MM3 (ref 3.77–5.28)
RPR SER QL: NORMAL
SODIUM SERPL-SCNC: 136 MMOL/L (ref 136–145)
WBC NRBC COR # BLD AUTO: 5.54 10*3/MM3 (ref 3.4–10.8)

## 2025-04-22 RX ORDER — FERROUS SULFATE 325(65) MG
325 TABLET ORAL DAILY
Qty: 30 TABLET | Refills: 4 | Status: SHIPPED | OUTPATIENT
Start: 2025-04-22 | End: 2025-09-19

## 2025-04-23 LAB
BACTERIA SPEC AEROBE CULT: NORMAL
RUBV IGG SERPL IA-ACNC: 3.67 INDEX

## 2025-04-28 PROBLEM — O10.919 CHRONIC HYPERTENSION AFFECTING PREGNANCY: Status: ACTIVE | Noted: 2025-04-21

## 2025-04-28 PROBLEM — Z98.891 H/O: C-SECTION: Status: ACTIVE | Noted: 2025-04-28

## 2025-04-28 PROBLEM — F32.A DEPRESSION AFFECTING PREGNANCY: Status: ACTIVE | Noted: 2025-04-28

## 2025-04-28 PROBLEM — O34.593 ABNORMALITY OF UTERUS DURING PREGNANCY IN THIRD TRIMESTER: Status: ACTIVE | Noted: 2022-05-11

## 2025-04-28 PROBLEM — O99.340 DEPRESSION AFFECTING PREGNANCY: Status: ACTIVE | Noted: 2025-04-28

## 2025-04-28 PROBLEM — O09.529 ANTEPARTUM MULTIGRAVIDA OF ADVANCED MATERNAL AGE: Status: ACTIVE | Noted: 2025-04-28

## 2025-04-28 NOTE — PROGRESS NOTES
Pt roomed by MA.  Provider had hospital emergency and pt could not wait.  Pt not seen by provider, and visit rescheduled.    Objective:  /81   Wt 54.9 kg (121 lb)   LMP 01/14/2025 (Approximate)   BMI 22.86 kg/m²

## 2025-04-28 NOTE — TELEPHONE ENCOUNTER
Results given to patient. NIPS negative (normal). Patient voiced already reviewed in mychart patient aware of gender.

## 2025-04-29 ENCOUNTER — INITIAL PRENATAL (OUTPATIENT)
Dept: OBSTETRICS AND GYNECOLOGY | Age: 35
End: 2025-04-29
Payer: COMMERCIAL

## 2025-04-29 VITALS — DIASTOLIC BLOOD PRESSURE: 81 MMHG | SYSTOLIC BLOOD PRESSURE: 126 MMHG | WEIGHT: 121 LBS | BODY MASS INDEX: 22.86 KG/M2

## 2025-04-29 DIAGNOSIS — Z34.80 SUPERVISION OF OTHER NORMAL PREGNANCY, ANTEPARTUM: Primary | ICD-10-CM

## 2025-04-29 LAB
GLUCOSE UR STRIP-MCNC: NEGATIVE MG/DL
PROT UR STRIP-MCNC: NEGATIVE MG/DL

## 2025-05-03 NOTE — PROGRESS NOTES
OB Initial Visit    CC- Here for care of current pregnancy, first visit  Chief Complaint   Patient presents with    Initial Prenatal Visit       Subjective:  35 y.o. presenting for her first obstetrical visit.    LMP: Patient's last menstrual period was 2025 (exact date).     ED Provider Notes by Izabella Berry APRN (04/10/2025 10:38) hyperemesis, discharged after IV Zofran.  Progress Notes by Ajith Barrera APRN (2025 10:00) viable IUP at 13 weeks and 6 days    IGP,CtNgTv,rfx Aptima HPV ASCU (10/21/2021 14:08) Pap only negative.  Due for pap today    Richard Panorama Prenatal Test: Chromosomes 13, 18, 21, X & Y: Triploidy 22Q.11.2 Deletion - Blood, (2025 10:39)  Comprehensive Metabolic Panel (2025 10:39)  OB Panel With HIV and RPR (2025 10:39)  Fentanyl, Urine - Urine, Clean Catch (2025 10:38)  Protein / Creatinine Ratio, Urine - Urine, Clean Catch (2025 10:38)  Urine Culture - Urine, Urine, Clean Catch (2025 10:38)  Urine Drug Screen - Urine, Clean Catch (2025 10:38)    Last seen by me in May 2022 after scheduled repeat .    Hx of CHTN, extremely thin lower uterine segment    LMP qmonth regular 25  THC -  has quit  Denies any complaints, is doing well  VZV immune: history of chicken pox  Prior OBSTETRIC history is significant for: CHTN    Reviewed and updated:  OBHx, GYNHx (STDs), PMHx, Medications, Allergies, PSHx, Social Hx, Preventative Hx (PAP), Vaccine Hx, Genetic Hx (pt, FOB, both families).        Objective:  /79   LMP 2025 (Exact Date)    Chaperone present during breast and/or pelvic exam if performed.  General- NAD, alert and oriented, appropriate  Psych- Normal mood, good memory  Neck- No masses, no thyroid enlargement  CV- Regular rhythm, no murmurs  Resp- CTA to bases, no wheezes  Abdomen- Soft, non distended, non tender, no masses    Breast left- No mass, non tender, no nipple discharge, no axillary or supraclavicular  nodes palpable.    Breast right- No mass, non tender, no nipple discharge, no axillary or supraclavicular nodes palpable.       External genitalia- Normal, no lesions  Urethra- Normal, no masses, non tender  Vagina- Normal, no discharge  Bladder- Normal, no masses, non tender  Cervix- Normal, no lesions, no discharge, no CMT  Uterus- Consistent with dates, Bedside US consistent with dates.  -160.   Adnexa- Normal, no mass, non tender    Lymphatic- No palpable neck, axillary, or groin nodes  Extremities- No edema, no cyanosis    Skin- No lesions, no rashes    Assessment and Plan:  Unknown   Diagnoses and all orders for this visit:    1. Supervision of other normal pregnancy, antepartum (Primary)  Comments:  new OB labs done w FREDERIC Barrera  Overview:  GUILLERMO finalized: 10/21/25 per LMP, 13-week US and ACOG    Optional testing NIPS,CF/SMA,AFP:  NIPS neg XY.  Considering CF and AFP, checking w insurance 5/5/2025     COVID vaccine: Recommended 5/5/2025   Tdap vaccine:  Flu vaccine:    1hr Glucola:  FU RPR w glucola:  ? Desires Sterilization:    Anatomy US:  FU US:    PROBLEM LIST/PLAN:   CHTN -  No meds.  Baseline labs normal, UPC 0.11.  ASA 81 mg- continue   CS x4 w THIN CEE w G4- see op note, rec del at 37+ sooner prn mat fetal indications  AMA 34yo at del  Anemia     Hx Depression- no meds. Stable  UDS +THC at previability - has quit 5/5/2025     Orders:  -     IgP, Aptima HPV; Future  -     Chlamydia trachomatis, Neisseria gonorrhoeae, PCR - Swab, Cervix; Future  -     POC Urinalysis Dipstick  -     US Ob Detail Fetal Anatomy Single or First Gestation; Future  -     IgP, Aptima HPV  -     Chlamydia trachomatis, Neisseria gonorrhoeae, PCR - Swab, Cervix    2. Maternal anemia in pregnancy, antepartum  Overview:  Iron QOD- continue  5/5/2025   Work up    Orders:  -     Ferritin  -     Iron Profile  -     Transferrin Saturation; Future  -     Vitamin B12 & Folate  -     Cancel: Transferrin  Saturation        Counseling:   Nutrition discussed, calories, activity/exercise in pregnancy  Discussed dietary restrictions/safety food preparation in pregnancy  Reviewed what to expect prenatal visits, office providers (female and male) and covering [facility] Hospitalists  Appropriate trimester precautions provided, N/V, vag bleeding, cramping  VACCINE importance in pregnancy discussed.  Maternal and fetal risk of not being vaccinated reviewed NLT increased risk maternal/fetal severity of illness/death, PTD, CS, hemorrhage, HTN, possible IUFD.  Significant maternal and fetal/infant benefit w vaccination.  FDA approval and ACOG/SMFM/CDC strong recommendation in pregnancy.  Questions answered.   Questions answered      Return in about 4 weeks (around 6/2/2025) for FU OB w anatomy US, then OV 4weeks later.            Lia Guzman, DO  05/05/2025    [facility] [address] [facility]  [address] [place] [address] [address] [place] [address]  Dept: [phone]  Dept Fax: [phone]  Loc: [phone]  Loc Fax: [phone]

## 2025-05-05 ENCOUNTER — INITIAL PRENATAL (OUTPATIENT)
Dept: OBSTETRICS AND GYNECOLOGY | Age: 35
End: 2025-05-05
Payer: COMMERCIAL

## 2025-05-05 VITALS — DIASTOLIC BLOOD PRESSURE: 79 MMHG | SYSTOLIC BLOOD PRESSURE: 123 MMHG

## 2025-05-05 DIAGNOSIS — O99.019 MATERNAL ANEMIA IN PREGNANCY, ANTEPARTUM: ICD-10-CM

## 2025-05-05 DIAGNOSIS — Z34.80 SUPERVISION OF OTHER NORMAL PREGNANCY, ANTEPARTUM: Primary | ICD-10-CM

## 2025-05-05 LAB
C TRACH RRNA CVX QL NAA+PROBE: NOT DETECTED
FERRITIN SERPL-MCNC: 15.8 NG/ML (ref 13–150)
FOLATE SERPL-MCNC: 11.2 NG/ML (ref 4.78–24.2)
GLUCOSE UR STRIP-MCNC: NEGATIVE MG/DL
IRON 24H UR-MRATE: 93 MCG/DL (ref 37–145)
IRON SATN MFR SERPL: 17 % (ref 20–50)
N GONORRHOEA RRNA SPEC QL NAA+PROBE: NOT DETECTED
PROT UR STRIP-MCNC: NEGATIVE MG/DL
TIBC SERPL-MCNC: 539 MCG/DL (ref 298–536)
TRANSFERRIN SERPL-MCNC: 362 MG/DL (ref 200–360)
VIT B12 BLD-MCNC: 500 PG/ML (ref 211–946)

## 2025-05-05 PROCEDURE — 87591 N.GONORRHOEAE DNA AMP PROB: CPT | Performed by: OBSTETRICS & GYNECOLOGY

## 2025-05-05 PROCEDURE — 84466 ASSAY OF TRANSFERRIN: CPT | Performed by: OBSTETRICS & GYNECOLOGY

## 2025-05-05 PROCEDURE — 87491 CHLMYD TRACH DNA AMP PROBE: CPT | Performed by: OBSTETRICS & GYNECOLOGY

## 2025-05-05 PROCEDURE — 87624 HPV HI-RISK TYP POOLED RSLT: CPT | Performed by: OBSTETRICS & GYNECOLOGY

## 2025-05-05 PROCEDURE — 82728 ASSAY OF FERRITIN: CPT | Performed by: OBSTETRICS & GYNECOLOGY

## 2025-05-05 PROCEDURE — 82746 ASSAY OF FOLIC ACID SERUM: CPT | Performed by: OBSTETRICS & GYNECOLOGY

## 2025-05-05 PROCEDURE — 83540 ASSAY OF IRON: CPT | Performed by: OBSTETRICS & GYNECOLOGY

## 2025-05-05 PROCEDURE — G0123 SCREEN CERV/VAG THIN LAYER: HCPCS | Performed by: OBSTETRICS & GYNECOLOGY

## 2025-05-05 PROCEDURE — 82607 VITAMIN B-12: CPT | Performed by: OBSTETRICS & GYNECOLOGY

## 2025-05-07 LAB
CYTOLOGIST CVX/VAG CYTO: NORMAL
CYTOLOGY CVX/VAG DOC CYTO: NORMAL
CYTOLOGY CVX/VAG DOC THIN PREP: NORMAL
DX ICD CODE: NORMAL
HPV I/H RISK 4 DNA CVX QL PROBE+SIG AMP: NEGATIVE
OTHER STN SPEC: NORMAL
SERVICE CMNT-IMP: NORMAL
STAT OF ADQ CVX/VAG CYTO-IMP: NORMAL

## 2025-05-15 ENCOUNTER — REFERRAL TRIAGE (OUTPATIENT)
Dept: LABOR AND DELIVERY | Facility: HOSPITAL | Age: 35
End: 2025-05-15
Payer: COMMERCIAL

## 2025-06-04 NOTE — PROGRESS NOTES
OB FOLLOW UP      Chief Complaint   Patient presents with    Routine Prenatal Visit     Subjective:   Sia Ramos is a 35 y.o.  20w1d patient being seen today for her routine obstetrical follow up visit.     Denies VB, leaking fluid, current regular cramping or contractions.    Nausea comes and goes.  Is eating well      Objective:  /78   Wt 61.7 kg (136 lb)   LMP 2025 (Exact Date)   BMI 25.70 kg/m²  8.618 kg (19 lb) Body mass index is 25.7 kg/m².  Chaperone present during pelvic exam if performed.  See OB flow sheet for fundal height (not performed if US day of OV), FHT, edema, cvx exam if performed, and Upro/Uglu.       Assessment and Plan:  20w3d     Diagnoses and all orders for this visit:    1. Supervision of other normal pregnancy, antepartum (Primary)  Overview:  GUILLERMO finalized: 10/21/25 per LMP, 13-week US and ACOG    Optional testing NIPS,CF/SMA,AFP:  NIPS neg XY.  Declines CF and AFP 2025     COVID vaccine: Recommended   Tdap vaccine:  Flu vaccine:    1hr Glucola:  FU RPR w glucola:  ? Desires Sterilization:    Anatomy US: [facility] 25 cwd, posterior placenta, vtx, completed wnl.  One view on US for cvx looks like previa, tech doesn't feel it is a previa, to be sure will recheck next OV.  FU US:    PROBLEM LIST/PLAN:   CHTN -  No meds.  Baseline labs normal, UPC 0.11.  ASA 81 mg- continue    Growth US   Weekly NST 32-34weeks  CS x4 w THIN CEE w G4- see op note, rec del at 37+ sooner prn mat fetal indications  AMA 34yo at del- growth scan, NSTs  Anemia     Hx Depression- no meds. Stable  UDS +THC at previability - has quit 25    Orders:  -     POC Urinalysis Dipstick    2. Maternal anemia in pregnancy, antepartum  Overview:  Iron QOD- continue  2025   Work up cw iron deficiency      3. Placenta previa antepartum  Overview:  1 picture on anatomy scan appeared to be previa, it was the cervical length picture.  Technician was confident it was not a placenta previa  however to be sure will recommend abstinence and follow-up ultrasound at next office visit to relook at placenta.    Orders:  -     US Ob Detail Fetal Anatomy Single or First Gestation; Future    Other orders  -     ondansetron (ZOFRAN) 4 MG tablet; Take 1 tablet by mouth Every 8 (Eight) Hours As Needed for Nausea or Vomiting.  Dispense: 30 tablet; Refill: 0      Counseling:    Second trimester precautions    Reassuring pregnancy progress. Continue PNV.      Return in about 4 weeks (around 7/4/2025) for FU OB w glucola, then OV 4weeks later.            Lai Guzman,   06/06/2025    [facility] [address] [facility]  [address] [place] [address] [address] [place] [address]  Dept: [phone]  Dept Fax: [phone]  Loc: [phone]  Loc Fax: [phone]

## 2025-06-06 ENCOUNTER — ROUTINE PRENATAL (OUTPATIENT)
Dept: OBSTETRICS AND GYNECOLOGY | Age: 35
End: 2025-06-06
Payer: COMMERCIAL

## 2025-06-06 ENCOUNTER — TELEPHONE (OUTPATIENT)
Dept: OBSTETRICS AND GYNECOLOGY | Age: 35
End: 2025-06-06
Payer: COMMERCIAL

## 2025-06-06 VITALS — WEIGHT: 136 LBS | DIASTOLIC BLOOD PRESSURE: 78 MMHG | SYSTOLIC BLOOD PRESSURE: 122 MMHG | BODY MASS INDEX: 25.7 KG/M2

## 2025-06-06 DIAGNOSIS — O44.00 PLACENTA PREVIA ANTEPARTUM: ICD-10-CM

## 2025-06-06 DIAGNOSIS — Z34.80 SUPERVISION OF OTHER NORMAL PREGNANCY, ANTEPARTUM: Primary | ICD-10-CM

## 2025-06-06 DIAGNOSIS — O99.019 MATERNAL ANEMIA IN PREGNANCY, ANTEPARTUM: ICD-10-CM

## 2025-06-06 LAB
GLUCOSE UR STRIP-MCNC: NEGATIVE MG/DL
PROT UR STRIP-MCNC: NEGATIVE MG/DL

## 2025-06-06 RX ORDER — ONDANSETRON 4 MG/1
4 TABLET, FILM COATED ORAL EVERY 8 HOURS PRN
Qty: 30 TABLET | Refills: 0 | Status: SHIPPED | OUTPATIENT
Start: 2025-06-06

## 2025-06-06 NOTE — TELEPHONE ENCOUNTER
Received a secure chat from Dr. Guzman, she wants patient to have another u/s at her next visit, which has been scheduled. After reviewing ultrasound photos one of the looked like she may have placenta previa but just to be on the safe side wants to recheck it. Recommend no intercourse until after her next visit.

## 2025-06-30 NOTE — PROGRESS NOTES
OB FOLLOW UP      Chief Complaint   Patient presents with    Routine Prenatal Visit     Subjective:   Sia Ramos is a 35 y.o.  24w0d patient being seen today for her routine obstetrical follow up visit.     No complaints.  Denies VB, leaking fluid, current regular cramping or contractions.    Objective:  /75   Wt 64.4 kg (142 lb)   LMP 2025 (Exact Date)   BMI 26.83 kg/m²  11.3 kg (25 lb) Body mass index is 26.83 kg/m².  Chaperone present during pelvic exam if performed.  See OB flow sheet for fundal height (not performed if US day of OV), FHT, edema, cvx exam if performed, and Upro/Uglu.       Assessment and Plan:  24w0d     Diagnoses and all orders for this visit:    1. Supervision of other normal pregnancy, antepartum (Primary)  Overview:  GUILLERMO finalized: 10/21/25 per LMP, 13-week US and ACOG    Optional testing NIPS,CF/SMA,AFP:  NIPS neg XY.  Declined CF and AFP     COVID vaccine: Recommended   Tdap vaccine: Recommended, 2025 desires  Flu vaccine:    1hr Glucola: 2025   FU RPR w glucola: 2025   ? Desires Sterilization:  Desires 2025 pw salpingectomy, poss occlusion    Anatomy US: [facility] 25 cwd, posterior placenta, vtx, completed wnl.  One view on US for cvx looks like previa, tech doesn't feel it is a previa, to be sure will recheck next OV.  FU US: [facility] 25 post previa, partial, cvx length 3.14    PROBLEM LIST/PLAN:   Previa  CHTN -  No meds.  Baseline labs normal, UPC 0.11.  ASA 81 mg- continue    Growth US   Weekly NST 32-34weeks  CS x4 w THIN CEE w G4- see op note, rec del at 37+ sooner prn mat fetal indications  AMA 36yo at del- growth scan, NSTs  Hx Depression- no meds. Stable  Anemia     UDS +THC at previability - has quit 25    Orders:  -     Gestational Diabetes Screen 1 Hour; Future  -     CBC (No Diff); Future  -     POC Urinalysis Dipstick  -     Treponema pallidum AB w/Reflex RPR; Future    2. Maternal anemia in pregnancy,  antepartum  Overview:  Iron QOD- continue  5/5/2025   Work up cw iron deficiency      3. Placenta previa in second trimester  Overview:  Posterior w hx CS x4  Pelvic rest  MFM consult to check for PAS    Orders:  -     Ambulatory Referral to MFM/Perinatology      Counseling:    OB precautions, leaking, VB, valentín ornelas vs PTL/Labor  FKC  PLACENTA PREVIA discussed.  Reviewed the dx, risks of bleeding, importance immediate care PRN bleeding, strongly recommend NO vaginal penetration until resolved.  Questions answered to her satisfaction. Reviewed PAS risk w previa and CS hx.      Reassuring pregnancy progress. Continue PNV.      Return in about 4 weeks (around 7/29/2025) for FU OB then [id number] x2.            Lia Guzman, DO  07/01/2025    [facility] [address] [facility]  [address] [place] [address] [address] [place] [address]  Dept: [phone]  Dept Fax: [phone]  Loc: [phone]  Loc Fax: [phone]

## 2025-07-01 ENCOUNTER — ROUTINE PRENATAL (OUTPATIENT)
Dept: OBSTETRICS AND GYNECOLOGY | Age: 35
End: 2025-07-01
Payer: COMMERCIAL

## 2025-07-01 VITALS — DIASTOLIC BLOOD PRESSURE: 75 MMHG | BODY MASS INDEX: 26.83 KG/M2 | WEIGHT: 142 LBS | SYSTOLIC BLOOD PRESSURE: 114 MMHG

## 2025-07-01 DIAGNOSIS — O99.019 MATERNAL ANEMIA IN PREGNANCY, ANTEPARTUM: ICD-10-CM

## 2025-07-01 DIAGNOSIS — Z34.80 SUPERVISION OF OTHER NORMAL PREGNANCY, ANTEPARTUM: Primary | ICD-10-CM

## 2025-07-01 DIAGNOSIS — O44.02 PLACENTA PREVIA IN SECOND TRIMESTER: ICD-10-CM

## 2025-07-01 PROBLEM — Z30.2 REQUEST FOR STERILIZATION: Status: ACTIVE | Noted: 2025-07-01

## 2025-07-01 LAB
DEPRECATED RDW RBC AUTO: 54.9 FL (ref 37–54)
ERYTHROCYTE [DISTWIDTH] IN BLOOD BY AUTOMATED COUNT: 16 % (ref 12.3–15.4)
GLUCOSE 1H P GLC SERPL-MCNC: 122 MG/DL (ref 65–139)
GLUCOSE UR STRIP-MCNC: NEGATIVE MG/DL
HCT VFR BLD AUTO: 33.9 % (ref 34–46.6)
HGB BLD-MCNC: 11 G/DL (ref 12–15.9)
MCH RBC QN AUTO: 30.3 PG (ref 26.6–33)
MCHC RBC AUTO-ENTMCNC: 32.4 G/DL (ref 31.5–35.7)
MCV RBC AUTO: 93.4 FL (ref 79–97)
PLATELET # BLD AUTO: 146 10*3/MM3 (ref 140–450)
PMV BLD AUTO: 12.9 FL (ref 6–12)
PROT UR STRIP-MCNC: NEGATIVE MG/DL
RBC # BLD AUTO: 3.63 10*6/MM3 (ref 3.77–5.28)
TREPONEMA PALLIDUM IGG+IGM AB [PRESENCE] IN SERUM OR PLASMA BY IMMUNOASSAY: NORMAL
WBC NRBC COR # BLD AUTO: 6.35 10*3/MM3 (ref 3.4–10.8)

## 2025-07-01 PROCEDURE — 86780 TREPONEMA PALLIDUM: CPT | Performed by: OBSTETRICS & GYNECOLOGY

## 2025-07-01 PROCEDURE — 85027 COMPLETE CBC AUTOMATED: CPT | Performed by: OBSTETRICS & GYNECOLOGY

## 2025-07-01 PROCEDURE — 82950 GLUCOSE TEST: CPT | Performed by: OBSTETRICS & GYNECOLOGY

## 2025-07-02 PROBLEM — O99.112 BENIGN GESTATIONAL THROMBOCYTOPENIA IN SECOND TRIMESTER: Status: ACTIVE | Noted: 2025-07-02

## 2025-07-02 PROBLEM — D69.6 BENIGN GESTATIONAL THROMBOCYTOPENIA IN SECOND TRIMESTER: Status: ACTIVE | Noted: 2025-07-02

## 2025-07-24 ENCOUNTER — TRANSCRIBE ORDERS (OUTPATIENT)
Dept: ULTRASOUND IMAGING | Facility: HOSPITAL | Age: 35
End: 2025-07-24
Payer: COMMERCIAL

## 2025-07-24 DIAGNOSIS — Z98.891 PREVIOUS CESAREAN SECTION: Primary | ICD-10-CM

## 2025-07-31 ENCOUNTER — OFFICE VISIT (OUTPATIENT)
Dept: OBSTETRICS AND GYNECOLOGY | Facility: CLINIC | Age: 35
End: 2025-07-31
Payer: COMMERCIAL

## 2025-07-31 ENCOUNTER — HOSPITAL ENCOUNTER (OUTPATIENT)
Dept: ULTRASOUND IMAGING | Facility: HOSPITAL | Age: 35
Discharge: HOME | End: 2025-07-31
Admitting: STUDENT IN AN ORGANIZED HEALTH CARE EDUCATION/TRAINING PROGRAM
Payer: COMMERCIAL

## 2025-07-31 VITALS
HEIGHT: 61 IN | BODY MASS INDEX: 28.4 KG/M2 | OXYGEN SATURATION: 98 % | WEIGHT: 150.4 LBS | DIASTOLIC BLOOD PRESSURE: 85 MMHG | TEMPERATURE: 98.5 F | SYSTOLIC BLOOD PRESSURE: 126 MMHG | HEART RATE: 111 BPM

## 2025-07-31 DIAGNOSIS — O44.00 PLACENTA PREVIA ANTEPARTUM: Primary | ICD-10-CM

## 2025-07-31 DIAGNOSIS — Z98.891 PREVIOUS CESAREAN SECTION: ICD-10-CM

## 2025-07-31 PROCEDURE — 76811 OB US DETAILED SNGL FETUS: CPT

## 2025-07-31 PROCEDURE — 76817 TRANSVAGINAL US OBSTETRIC: CPT

## 2025-07-31 PROCEDURE — 76819 FETAL BIOPHYS PROFIL W/O NST: CPT

## 2025-07-31 NOTE — PROGRESS NOTES
Pt reports that she is doing well and denies vaginal bleeding, cramping, contractions or LOF at this time. Reports active fetal movement. Reviewed when to call OB office or present to L&D for evaluation with symptoms such as decreased fetal movement, vaginal bleeding, LOF or ctxs. Pt verbalized understanding. Reports waking up with occasional, mild HA. Denies visual changes or epigastric pain. Denies any additional complaints at time of appointment. Next OB appointment scheduled for 8/1.        Vitals:    07/31/25 1454   BP: 126/85   Pulse: 111   Temp: 98.5 °F (36.9 °C)   SpO2: 98%

## 2025-07-31 NOTE — LETTER
2025     Lia Guzman DO  [address] [place] [address]    Patient: Sia Ramos   YOB: 1990   Date of Visit: 2025       Dear Lia Guzman DO,    Thank you for referring Sia Ramos to me for evaluation. Below is a copy of my consult note.    If you have questions, please do not hesitate to call me. I look forward to following Sia along with you.         Sincerely,        Georgiana Owen MD      MATERNAL FETAL MEDICINE Consult Note    Dear Dr Lia Guzman DO:    Thank you for your kind referral of Sia Ramos.  As you know, she is a 35 y.o.   28w2d gestation (Estimated Date of Delivery: 10/21/25). This is a consult.      Her antepartum course is complicated by:  Complete placenta previa--with placenta accreta spectrum noted today.  AMA  4 prior CS    Aneuploidy Screening: low risk    HPI: Today, she denies headache, blurry vision, RUQ pain. No vaginal bleeding, no contractions.     Review of History:  Past Medical History:   Diagnosis Date   • Anxiety    • Benign gestational thrombocytopenia in third trimester 2022    • Depression 2022   • Essential hypertension 2021   • Extremely THIN CEE at CS 2022    I have dw pt and FOB due to thinness and unable to place two layer closer strongly consider this be last child, if she does conceive, I'd rec at least 2years to heal and del at 37+0 sooner prn mat/fetal indications   • Migraine      Past Surgical History:   Procedure Laterality Date   •  SECTION      x4   •  SECTION N/A 2022    Procedure:  SECTION REPEAT;  Surgeon: Lia Guzman DO;  Location: [facility] LABOR DELIVERY;  Service: Obstetrics/Gynecology;  Laterality: N/A;     Social History     Socioeconomic History   • Marital status: Single   • Number of children: 3   • Years of education: 12   • Highest education level: 12th grade   Tobacco Use   • Smoking status: Never   •  "Smokeless tobacco: Never   Vaping Use   • Vaping status: Never Used   Substance and Sexual Activity   • Alcohol use: Never   • Drug use: Not Currently   • Sexual activity: Yes     Partners: Male     Birth control/protection: None     Family History   Problem Relation Age of Onset   • Heart disease Other    • Diabetes Other    • Breast cancer Neg Hx    • Ovarian cancer Neg Hx    • Uterine cancer Neg Hx    • Colon cancer Neg Hx    • Deep vein thrombosis Neg Hx    • Pulmonary embolism Neg Hx       No Known Allergies   Current Outpatient Medications on File Prior to Visit   Medication Sig Dispense Refill   • aspirin 81 MG EC tablet Take 1 tablet by mouth Daily. 90 tablet 7   • ferrous sulfate 325 (65 FE) MG tablet Take 1 tablet by mouth Daily for 150 days. Take at a different time than prenatal vitamin 30 tablet 4   • ondansetron (ZOFRAN) 4 MG tablet Take 1 tablet by mouth Every 8 (Eight) Hours As Needed for Nausea or Vomiting. 30 tablet 0   • Prenatal Vit-Fe Fumarate-FA (PRENATAL PO) Take  by mouth.       No current facility-administered medications on file prior to visit.        Past obstetric, gynecological, medical, surgical, family and social history reviewed.  Relevant lab work and imaging reviewed.    Review of systems  As above in HPI     Vitals:    07/31/25 1454   BP: 126/85   BP Location: Left arm   Patient Position: Sitting   Pulse: 111   Temp: 98.5 °F (36.9 °C)   TempSrc: Temporal   SpO2: 98%   Weight: 68.2 kg (150 lb 6.4 oz)   Height: 154.9 cm (61\")       PHYSICAL EXAM   GENERAL: Not in acute distress, AAOx3, pleasant  CARDIO: regular rate and rhythm  PULM: symmetric chest rise, speaking in complete sentences without difficulty  NEURO: awake, alert and oriented to person, place, and time  ABDOMINAL: No fundal tenderness, no rebound or guarding, gravid  EXTREMITIES: no bilateral lower extremity edema/tenderness  SKIN: Warm, well-perfused      ULTRASOUND   Please view full ultrasound note on Imaging tab "   Cephalic presentation.  Complete placenta previa with placenta accreta involving cervix.  Suspect increta or percreta.   KAREN 19 cm, which is normal.   EFW 1193 g (35% AC 40%)  Normal appearing fetal anatomy.   BPP 8/8  Cervical length difficult to assess as cervix is invaded by accreta.  Appears subjectively long.        ASSESSMENT/COUNSELIN y.o.   28w2d gestation (Estimated Date of Delivery: 10/21/25).       -Pregnancy  [ X ] stable  [   ] improving [  ] worsening    Diagnoses and all orders for this visit:    1. Placenta previa antepartum (Primary)           Placenta accreta:  Her pretest probability of placenta accreta given her 4 prior CS is 67%.  Placenta accreta spectrum is used to describe abnormal trophoblast invasion into the myometrium, and sometimes to or beyond the serosa. It is clinically important because the placenta does not spontaneously separate at delivery and attempts at manual removal result in hemorrhage, which can be life-threatening and usually necessitates hysterectomy. We discussed that when this is discovered antenatally, a planned  hysterectomy at an accreta center can be performed and outcomes are good in this scenario.  The pathogenesis of most cases of PAS is thought to be placental implantation at an area of defective decidualization caused by preexisting damage to the endometrial-myometrial interface. The most important risk factors for PAS is a placenta previa after a prior  delivery and uterine surgery--the patient has had 4 prior cs.      Generally, in women with a high suspicion for placenta accreta, we recommend  hysterectomy without attempted placental removal given the concern for life-threatening hemorrhage. We generally recommend delivery at 34-35 weeks, unless indicated sooner, to prevent spontaneous labor and need for emergent delivery, the latter of which negatively affects maternal morbidity. We discussed likely need for  intraoperative and postoperative blood transfusion, general risks of surgery (bladder/bowel injury, injection, DVT, hemorrhage), and likely at least overnight ICU stay afterwards.  We discussed need for NICU admission likely for the baby and plan for steroids leading up to planned delivery. Discussed I will leave details of intraoperative and post operative care to  to discuss with her as this can differ slightly between institutions.      We also reviewed that she will have a bilateral salpingectomy as studies have shown that removal of the fallopian tubes can lead to a decrease in her risk of primary peritoneal cancer in the future but unless removal indicated, they would likely try to leave her ovaries in place to prevent early menopause and for cardiovascular/bone benefit.  She will meet with  team ASAP as they have the closest [facility] center and plan for delivery there.  Discussed they may or may not recommend an MRI for intraoperative planning.    Summary of Plan  -ASAP referral to  team for placenta accreta (suspect increta or percreta)--appreciate care  -2 week visit scheduled at [facility], [place], operated by [facility] to avoid gap in care in case appt is not in nexct 2 weeks--can cancel if pt establishes with .    -Serial growth exams and ANFS  -Anticipate delivery 34-35 weeks with C-hyst and steroids leading up.      Follow-up: 2 weeks if not established with     Thank you for the consult and opportunity to care for this patient.  Please feel free to reach out with any questions or concerns.      I spent 45 minutes caring for this patient on this date of service. This time includes time spent by me in the following activities: preparing for the visit, reviewing tests, obtaining and/or reviewing a separately obtained history, performing a medically appropriate examination and/or evaluation, counseling and educating the patient/family/caregiver and independently interpreting results and communicating that information with the  patient/family/caregiver with greater than 50% spent in counseling and coordination of care.       I spent 8 minutes on the separately reported service of US imaging not included in the time used to support the E/M service also reported today.      Georgiana Owen MD [facility]  Maternal Fetal Medicine-[facility]  Office: [phone]  [email or web address]

## 2025-07-31 NOTE — PROGRESS NOTES
MATERNAL FETAL MEDICINE Consult Note    Dear Dr Lia Guzman DO:    Thank you for your kind referral of Sia Ramos.  As you know, she is a 35 y.o.   28w2d gestation (Estimated Date of Delivery: 10/21/25). This is a consult.      Her antepartum course is complicated by:  Complete placenta previa--with placenta accreta spectrum noted today.  AMA  4 prior CS    Aneuploidy Screening: low risk    HPI: Today, she denies headache, blurry vision, RUQ pain. No vaginal bleeding, no contractions.     Review of History:  Past Medical History:   Diagnosis Date    Anxiety     Benign gestational thrombocytopenia in third trimester 2022     Depression 2022    Essential hypertension 2021    Extremely THIN CEE at CS 2022    I have dw pt and FOB due to thinness and unable to place two layer closer strongly consider this be last child, if she does conceive, I'd rec at least 2years to heal and del at 37+0 sooner prn mat/fetal indications    Migraine      Past Surgical History:   Procedure Laterality Date     SECTION      x4     SECTION N/A 2022    Procedure:  SECTION REPEAT;  Surgeon: Lia Guzman DO;  Location: [facility] LABOR DELIVERY;  Service: Obstetrics/Gynecology;  Laterality: N/A;     Social History     Socioeconomic History    Marital status: Single    Number of children: 3    Years of education: 12    Highest education level: 12th grade   Tobacco Use    Smoking status: Never    Smokeless tobacco: Never   Vaping Use    Vaping status: Never Used   Substance and Sexual Activity    Alcohol use: Never    Drug use: Not Currently    Sexual activity: Yes     Partners: Male     Birth control/protection: None     Family History   Problem Relation Age of Onset    Heart disease Other     Diabetes Other     Breast cancer Neg Hx     Ovarian cancer Neg Hx     Uterine cancer Neg Hx     Colon cancer Neg Hx     Deep vein thrombosis Neg Hx     Pulmonary  "embolism Neg Hx       No Known Allergies   Current Outpatient Medications on File Prior to Visit   Medication Sig Dispense Refill    aspirin 81 MG EC tablet Take 1 tablet by mouth Daily. 90 tablet 7    ferrous sulfate 325 (65 FE) MG tablet Take 1 tablet by mouth Daily for 150 days. Take at a different time than prenatal vitamin 30 tablet 4    ondansetron (ZOFRAN) 4 MG tablet Take 1 tablet by mouth Every 8 (Eight) Hours As Needed for Nausea or Vomiting. 30 tablet 0    Prenatal Vit-Fe Fumarate-FA (PRENATAL PO) Take  by mouth.       No current facility-administered medications on file prior to visit.        Past obstetric, gynecological, medical, surgical, family and social history reviewed.  Relevant lab work and imaging reviewed.    Review of systems  As above in HPI     Vitals:    25 1454   BP: 126/85   BP Location: Left arm   Patient Position: Sitting   Pulse: 111   Temp: 98.5 °F (36.9 °C)   TempSrc: Temporal   SpO2: 98%   Weight: 68.2 kg (150 lb 6.4 oz)   Height: 154.9 cm (61\")       PHYSICAL EXAM   GENERAL: Not in acute distress, AAOx3, pleasant  CARDIO: regular rate and rhythm  PULM: symmetric chest rise, speaking in complete sentences without difficulty  NEURO: awake, alert and oriented to person, place, and time  ABDOMINAL: No fundal tenderness, no rebound or guarding, gravid  EXTREMITIES: no bilateral lower extremity edema/tenderness  SKIN: Warm, well-perfused      ULTRASOUND   Please view full ultrasound note on Imaging tab   Cephalic presentation.  Complete placenta previa with placenta accreta involving cervix.  Suspect increta or percreta.   KAREN 19 cm, which is normal.   EFW 1193 g (35% AC 40%)  Normal appearing fetal anatomy.   BPP 8/8  Cervical length difficult to assess as cervix is invaded by accreta.  Appears subjectively long.        ASSESSMENT/COUNSELIN y.o.   28w2d gestation (Estimated Date of Delivery: 10/21/25).       -Pregnancy  [ X ] stable  [   ] improving [  ] " worsening    Diagnoses and all orders for this visit:    1. Placenta previa antepartum (Primary)           Placenta accreta:  Her pretest probability of placenta accreta given her 4 prior CS is 67%.  Placenta accreta spectrum is used to describe abnormal trophoblast invasion into the myometrium, and sometimes to or beyond the serosa. It is clinically important because the placenta does not spontaneously separate at delivery and attempts at manual removal result in hemorrhage, which can be life-threatening and usually necessitates hysterectomy. We discussed that when this is discovered antenatally, a planned  hysterectomy at an accreta center can be performed and outcomes are good in this scenario.  The pathogenesis of most cases of PAS is thought to be placental implantation at an area of defective decidualization caused by preexisting damage to the endometrial-myometrial interface. The most important risk factors for PAS is a placenta previa after a prior  delivery and uterine surgery--the patient has had 4 prior cs.      Generally, in women with a high suspicion for placenta accreta, we recommend  hysterectomy without attempted placental removal given the concern for life-threatening hemorrhage. We generally recommend delivery at 34-35 weeks, unless indicated sooner, to prevent spontaneous labor and need for emergent delivery, the latter of which negatively affects maternal morbidity. We discussed likely need for intraoperative and postoperative blood transfusion, general risks of surgery (bladder/bowel injury, injection, DVT, hemorrhage), and likely at least overnight ICU stay afterwards.  We discussed need for NICU admission likely for the baby and plan for steroids leading up to planned delivery. Discussed I will leave details of intraoperative and post operative care to  to discuss with her as this can differ slightly between institutions.      We also reviewed that she will have a  bilateral salpingectomy as studies have shown that removal of the fallopian tubes can lead to a decrease in her risk of primary peritoneal cancer in the future but unless removal indicated, they would likely try to leave her ovaries in place to prevent early menopause and for cardiovascular/bone benefit.  She will meet with [facility] team ASAP as they have the closest accreta center and plan for delivery there.  Discussed they may or may not recommend an MRI for intraoperative planning.    Summary of Plan  -ASAP referral to [facility] team for placenta accreta (suspect increta or percreta)--appreciate care  -2 week visit scheduled at [facility] to avoid gap in care in case appt is not in nexct 2 weeks--can cancel if pt establishes with [facility].    -Serial growth exams and ANFS  -Anticipate delivery 34-35 weeks with C-hyst and steroids leading up.      Follow-up: 2 weeks if not established with [facility]    Thank you for the consult and opportunity to care for this patient.  Please feel free to reach out with any questions or concerns.      I spent 45 minutes caring for this patient on this date of service. This time includes time spent by me in the following activities: preparing for the visit, reviewing tests, obtaining and/or reviewing a separately obtained history, performing a medically appropriate examination and/or evaluation, counseling and educating the patient/family/caregiver and independently interpreting results and communicating that information with the patient/family/caregiver with greater than 50% spent in counseling and coordination of care.       I spent 8 minutes on the separately reported service of US imaging not included in the time used to support the E/M service also reported today.      Georgiana Owen MD FACOG  Maternal Fetal Medicine-[facility]  Office: [phone]  [email or web address]

## 2025-08-01 PROBLEM — O09.43 SUPERVISION OF HIGH-RISK PREGNANCY WITH GRAND MULTIPARITY IN THIRD TRIMESTER: Status: ACTIVE | Noted: 2025-04-21

## 2025-08-01 PROBLEM — O43.223: Status: ACTIVE | Noted: 2025-08-01

## 2025-08-04 ENCOUNTER — TELEPHONE (OUTPATIENT)
Dept: OBSTETRICS AND GYNECOLOGY | Facility: CLINIC | Age: 35
End: 2025-08-04
Payer: COMMERCIAL

## 2025-08-05 ENCOUNTER — DOCUMENTATION (OUTPATIENT)
Dept: OBSTETRICS AND GYNECOLOGY | Facility: CLINIC | Age: 35
End: 2025-08-05
Payer: COMMERCIAL

## 2025-08-05 ENCOUNTER — TRANSCRIBE ORDERS (OUTPATIENT)
Dept: ULTRASOUND IMAGING | Facility: HOSPITAL | Age: 35
End: 2025-08-05
Payer: COMMERCIAL

## 2025-08-05 DIAGNOSIS — Z98.891 PREVIOUS CESAREAN SECTION: Primary | ICD-10-CM

## 2025-08-05 DIAGNOSIS — O44.02 PLACENTA PREVIA IN SECOND TRIMESTER: ICD-10-CM

## 2025-08-14 ENCOUNTER — TELEPHONE (OUTPATIENT)
Dept: ULTRASOUND IMAGING | Facility: HOSPITAL | Age: 35
End: 2025-08-14
Payer: COMMERCIAL

## (undated) DEVICE — CVR HNDL LT SURG ACCSSRY BLU STRL

## (undated) DEVICE — VIOLET BRAIDED (POLYGLACTIN 910), SYNTHETIC ABSORBABLE SUTURE: Brand: COATED VICRYL

## (undated) DEVICE — SUT MNCRYL 3/0 CT1 36 IN Y944H

## (undated) DEVICE — SUT VICRYL 3/0 CT1 27IN J258H

## (undated) DEVICE — TRY CATH FOL ADVANCE SIL W/BAG 16F

## (undated) DEVICE — DRSNG PAD ABD 8X10IN STRL

## (undated) DEVICE — SUT VIC 0 CTX 36IN J978H

## (undated) DEVICE — GLV SURG BIOGEL LTX PF 6 1/2

## (undated) DEVICE — Device: Brand: PORTEX

## (undated) DEVICE — INTENDED FOR TISSUE SEPARATION, AND OTHER PROCEDURES THAT REQUIRE A SHARP SURGICAL BLADE TO PUNCTURE OR CUT.: Brand: BARD-PARKER ® CARBON RIB-BACK BLADES

## (undated) DEVICE — DEV TRANSF BLD W/LUER ADPT CA/198

## (undated) DEVICE — SUT CHRM 0 CT1 36IN 924H

## (undated) DEVICE — PROXIMATE RH ROTATING HEAD SKIN STAPLERS (35 WIDE) CONTAINS 35 STAINLESS STEEL STAPLES: Brand: PROXIMATE

## (undated) DEVICE — PAD GRND REM POLYHESIVE A/ DISP

## (undated) DEVICE — STERILE POLYISOPRENE POWDER-FREE SURGICAL GLOVES WITH EMOLLIENT COATING: Brand: PROTEXIS

## (undated) DEVICE — C SECTION PACK: Brand: MEDLINE INDUSTRIES, INC.

## (undated) DEVICE — NEEDLE,18GX1.5",REG,BEVEL: Brand: MEDLINE